# Patient Record
Sex: MALE | Race: WHITE | Employment: UNEMPLOYED | URBAN - METROPOLITAN AREA
[De-identification: names, ages, dates, MRNs, and addresses within clinical notes are randomized per-mention and may not be internally consistent; named-entity substitution may affect disease eponyms.]

---
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
## 2018-02-20 ENCOUNTER — OFFICE VISIT (OUTPATIENT)
Dept: FAMILY MEDICINE CLINIC | Age: 18
End: 2018-02-20

## 2018-02-20 VITALS
SYSTOLIC BLOOD PRESSURE: 116 MMHG | WEIGHT: 170.4 LBS | BODY MASS INDEX: 26.74 KG/M2 | DIASTOLIC BLOOD PRESSURE: 75 MMHG | HEIGHT: 67 IN | OXYGEN SATURATION: 96 % | RESPIRATION RATE: 16 BRPM | TEMPERATURE: 97.5 F | HEART RATE: 59 BPM

## 2018-02-20 DIAGNOSIS — R52 BODY ACHES: Primary | ICD-10-CM

## 2018-02-20 DIAGNOSIS — J45.41 MODERATE PERSISTENT ASTHMA WITH ACUTE EXACERBATION: ICD-10-CM

## 2018-02-20 DIAGNOSIS — R51.9 ACUTE NONINTRACTABLE HEADACHE, UNSPECIFIED HEADACHE TYPE: ICD-10-CM

## 2018-02-20 DIAGNOSIS — Z20.828 EXPOSURE TO INFLUENZA: ICD-10-CM

## 2018-02-20 LAB
FLUAV+FLUBV AG NOSE QL IA.RAPID: NEGATIVE POS/NEG
FLUAV+FLUBV AG NOSE QL IA.RAPID: NEGATIVE POS/NEG
VALID INTERNAL CONTROL?: YES

## 2018-02-20 RX ORDER — OSELTAMIVIR PHOSPHATE 75 MG/1
75 CAPSULE ORAL DAILY
Qty: 10 CAP | Refills: 0 | Status: SHIPPED | OUTPATIENT
Start: 2018-02-20 | End: 2018-02-20 | Stop reason: SDUPTHER

## 2018-02-20 RX ORDER — OSELTAMIVIR PHOSPHATE 75 MG/1
75 CAPSULE ORAL DAILY
Qty: 5 CAP | Refills: 0 | Status: SHIPPED | OUTPATIENT
Start: 2018-02-20 | End: 2018-02-21 | Stop reason: SDUPTHER

## 2018-02-20 RX ORDER — OSELTAMIVIR PHOSPHATE 75 MG/1
75 CAPSULE ORAL DAILY
Qty: 5 CAP | Refills: 0 | Status: SHIPPED | OUTPATIENT
Start: 2018-02-20 | End: 2018-02-20 | Stop reason: SDUPTHER

## 2018-02-20 NOTE — PATIENT INSTRUCTIONS
-I recommend supportive care including rest, adequate hydration, warm salt water gargle, cough drops, warm tea with honey, ibuprofen/tylenol as needed. -Return to clinic if symptoms worsen or fail to improve and/or if you develop fever non-responsive to anti-pyretic (tylenol/ibuprofen), persistent nausea/vomiting, unable to eat/drink, dehydration, confusion/altered mental status. Oseltamivir (By mouth)   Oseltamivir (er-irh-KMO-i-vir)  Treats and helps prevent influenza. Brand Name(s): Tamiflu   There may be other brand names for this medicine. When This Medicine Should Not Be Used: This medicine is not right for everyone. Do not use it if you had an allergic reaction to oseltamivir. How to Use This Medicine:   Capsule, Liquid  · Your doctor will tell you how much medicine to use. Do not use more than directed. Do not take this medicine for any illness other than the flu. · Start taking this medicine as soon as possible after flu symptoms begin or after you are exposed to the flu (within the first 2 days). · Shake the oral liquid before each use. Measure the liquid medicine with the oral dispenser that came with the medicine. Ask your pharmacist for an oral measuring spoon or syringe if you do not have one. · You may open the capsule and mix the contents with a sweet liquid (such as chocolate syrup, corn syrup, or sugar dissolved in water). Ask your doctor or pharmacist if you have any questions. · Read and follow the patient instructions that come with this medicine. Talk to your doctor or pharmacist if you have any questions. · Missed dose: If you miss a dose and your next dose is due within 2 hours, skip the missed dose and take your medicine at the normal time. Do not use extra medicine to make up for a missed dose. If you miss a dose and your next dose is due in more than 2 hours, take the missed dose as soon as you can.  Then take your next dose at the normal time, and go back to your regular schedule. · Capsules: Store at room temperature, away from heat, moisture, and direct light. · Oral liquid: Store in the refrigerator and use within 17 days. Do not freeze. You may also store the medicine at room temperature, but use it within 10 days. Throw away any medicine that has not been used within this time. Drugs and Foods to Avoid:   Ask your doctor or pharmacist before using any other medicine, including over-the-counter medicines, vitamins, and herbal products. · Do not use this medicine if you have received the live influenza vaccine (nasal mist) in the past 2 weeks or if you will receive the vaccine within 48 hours, unless your doctor says it is okay. Warnings While Using This Medicine:   · Tell your doctor if you are pregnant or breastfeeding, or if you have kidney disease, liver disease, heart disease, lung disease, or a weakened immune system. · This medicine may cause the following problems:   ¨ Serious skin reactions  ¨ Unusual thoughts or behaviors  · Call your doctor if your symptoms do not improve or if they get worse. · The liquid form of this medicine contains sorbitol. Tell your doctor if you have hereditary fructose intolerance. · This medicine is not a substitute for a yearly flu shot. It also will not prevent a bacterial infection. · Keep all medicine out of the reach of children. Never share your medicine with anyone.   Possible Side Effects While Using This Medicine:   Call your doctor right away if you notice any of these side effects:  · Allergic reaction: Itching or hives, swelling in your face or hands, swelling or tingling in your mouth or throat, chest tightness, trouble breathing  · Blistering, peeling, red skin rash  · Confusion, agitation, seeing or hearing things that are not there, change in mood or behavior, seizures  · Fever, chills, cough, sore throat, body aches  If you notice these less serious side effects, talk with your doctor:   · Nausea, vomiting, diarrhea, stomach pain, or upset stomach  If you notice other side effects that you think are caused by this medicine, tell your doctor. Call your doctor for medical advice about side effects. You may report side effects to FDA at 7-728-FDA-3830  © 2017 [address] Information is for End User's use only and may not be sold, redistributed or otherwise used for commercial purposes. The above information is an  only. It is not intended as medical advice for individual conditions or treatments. Talk to your doctor, nurse or pharmacist before following any medical regimen to see if it is safe and effective for you.

## 2018-02-20 NOTE — PROGRESS NOTES
Chief Complaint:  Chief Complaint   Patient presents with    Generalized Body Aches     Per mothet symptoms begin this morning. HX of Asthma.  Headache    Abdominal Pain     HPI  Jae Stein is a 21 y.o. male who presents for generalized body aches since this morning. No fever yet. Does have a mild headache. Mild abdominal discomfort but no vomiting, diarrhea. Multiple people in his school choir have the flu. Hasn't taken any medications for sxs today. Drinking well. Mom brought son in because years ago he had hospitalizations for his asthma, and she doesn't want him to get worse. Patient endorses on a typical week may use his albuterol inhaler several times after activity or choir. 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 5 Cap 0    albuterol (PROVENTIL HFA, VENTOLIN HFA, PROAIR HFA) 90 mcg/actuation inhaler Take 2 Puffs by inhalation every four (4) hours as needed for Wheezing.  One for home and one for school  Indications: Acute Asthma Attack 2 Inhaler 4    albuterol (PROVENTIL VENTOLIN) 2.5 mg /3 mL (0.083 %) nebulizer solution Take 2.5 mg via neb every 4 hours as needed for cough and wheeze 100 Each 4    fluticasone-salmeterol (ADVAIR DISKUS) 500-50 mcg/dose diskus inhaler INHALE ONE DOSE BY MOUTH EVERY 12 HOURS 1 Inhaler 6    EPINEPHrine (EPIPEN 2-RASHEED) 0.3 mg/0.3 mL injection 0.3 mL by IntraMUSCular route once as needed for up to 1 dose. 0.6 mL 2    loratadine (CLARITIN) 10 mg tablet Take 1 Tab by mouth daily. 30 Tab 5    inhalational spacing device 1 Each by Does Not Apply route as needed. Any brand ok 1 Device 1    methylphenidate ER 36 mg 24 hr tab Take 1 Tab (36 mg total) by mouth dailyEarliest Fill Date: 10/23/17. Max Daily Amount: 36 mg 30 Tab 0    fluticasone (FLONASE) 50 mcg/actuation nasal spray Take 1 spray each nostril twice a day 1 Bottle 5     PMH:  Past Medical History:   Diagnosis Date    ADHD (attention deficit hyperactivity disorder)     Asthma     Community acquired pneumonia     H1N1 influenza     Learning disability      SH:  Smoker:  History   Smoking Status    Never Smoker   Smokeless Tobacco    Never Used     Physical Exam:  Visit Vitals    /75 (BP 1 Location: Right arm, BP Patient Position: Sitting)    Pulse (!) 59    Temp 97.5 °F (36.4 °C) (Oral)    Resp 16    Ht 5' 7\" (1.702 m)    Wt 170 lb 6.4 oz (77.3 kg)    SpO2 96%    BMI 26.69 kg/m2     Gen: No apparent distress but does appear a little tired. Alert and oriented and responds to all questions appropriately. Eye: Conjunctiva normal, PERRL, EOMI  Ear: Hearing grossly normal bilaterally, no apparent lesion or abnormality of external ear, bilateral TM's and external ear canals normal  Nose: Nares patent without inflammation   Throat: MMM, pharynx normal without exudate or significant swelling  Neck: Supple, no lymph nodes, masses, or thyromegaly appreciated  Lungs: Respirations unlabored, clear to auscultation bilaterally  Cardio: Regular rate and rhythm without murmurs, rubs, or gallops   Abdomen: Normoactive bowel sounds, soft, nontender, nondistended    Rapid flu test negative. Assessment and Plan:       ICD-10-CM ICD-9-CM    1. Body aches R52 780.96 AMB POC CAYDEN INFLUENZA A/B TEST   2.  Acute nonintractable headache, unspecified headache type R51 784.0    3. Exposure to influenza Z20.828 V01.79    4. Moderate persistent asthma with acute exacerbation J45.41 493.92      -Will treat for influenza with tamiflu given sxs, +flu contacts, h/o asthma. Alternatively, mom may give prophylaxis dosing if he continues to do well symptomatically throughout today (dosing discussed). -Encouraged supportive care including rest, adequate hydration, warm salt water gargle, cough drops, warm tea with honey, ibuprofen/tylenol prn.   -Rtc if sxs worsen or fail to improve and/or if develops fever non-responsive to anti-pyretic, persistent N/V, unable to take po, altered mental status.  -Did not have time to fully address asthma mgt today. He is not in acute exacerbation. Encouraged f/u with Pulm asap as it sounds his asthma is not under optimal control at baseline. We will be happy to see him in clinic for further evaluation of this if he cannot see Pulm soon. Mom states she will make appt with [facility] Garcia. Patient discussed with Dr. Aisha Manzano, Attending Physician.     Jorden Branch MD  Family Medicine Resident, PGY-3

## 2018-02-20 NOTE — MR AVS SNAPSHOT
[address] 
[phone] Patient: Danilo Tse MRN: [id number] IBJ:3/30/5004 Visit Information Date & Time Provider Department Dept. Phone Encounter #  
 2/20/2018  9:10 AM Bill Drummond MD [facility] [phone] [id number] Follow-up Instructions Return if symptoms worsen or fail to improve. Upcoming Health Maintenance Date Due  
 HPV AGE 9Y-34Y (1 of 3 - Male 3 Dose Series) 6/13/2009 Influenza Age 5 to Adult 8/1/2017 DTaP/Tdap/Td series (6 - Td) 5/18/2020 Allergies as of 2/20/2018  Review Complete On: 2/20/2018 By: Bill Drummond MD  
  
 Severity Noted Reaction Type Reactions Measles, Mumps, And Rubella Vaccine Live High 11/18/2013    Anaphylaxis Pcn [Penicillins]  11/12/2013    Rash Peanut Low 11/12/2013   Not Verified Swelling Mom states not allergic Current Immunizations  Reviewed on 2/9/2015 Name Date DTaP 1/12/2000, 4/8/1999, 2/4/1999, 1998 Hep A Vaccine 5/6/2009 Hep A Vaccine 2 Dose Schedule (Ped/Adol) 10/21/2015  6:16 PM  
 Hep B Vaccine 2/4/1999, 1998, 1998 Hib 1/12/2000, 4/8/1999, 2/4/1999, 1998 Influenza Vaccine 9/20/2013, 12/10/2012, 9/30/2009, 10/23/2008, 11/29/2006, 10/10/2005 Influenza Vaccine (Quad) PF 11/3/2014 MMR 9/7/1999 Meningococcal (MPSV4) Vaccine 10/21/2015  6:14 PM  
 Meningococcal ACWY Vaccine 5/18/2010 Pneumococcal Vaccine (Unspecified Type) 8/31/2000 Poliovirus vaccine 7/28/2009, 9/7/1999, 2/4/1999, 1998 Tdap 5/18/2010, 5/6/2009 Varicella Virus Vaccine 1/12/2000 Not reviewed this visit You Were Diagnosed With   
  
 Codes Comments Body aches    -  Primary ICD-10-CM: I53 ICD-9-CM: 780.96 Acute nonintractable headache, unspecified headache type     ICD-10-CM: R51 ICD-9-CM: 784.0 Exposure to influenza     ICD-10-CM: Z20.828 ICD-9-CM: V01.79 Vitals BP Pulse Temp Resp Height(growth percentile) 116/75 (37 %/ 45 %)* (BP 1 Location: Right arm, BP Patient Position: Sitting) (!) 59 97.5 °F (36.4 °C) (Oral) 16 5' 7\" (1.702 m) (18 %, Z= -0.92) Weight(growth percentile) SpO2 BMI Smoking Status 170 lb 6.4 oz (77.3 kg) (72 %, Z= 0.57) 96% 26.69 kg/m2 (84 %, Z= 1.01) Never Smoker *BP percentiles are based on NHBPEP's 4th Report Growth percentiles are based on CDC 2-20 Years data. Vitals History BMI and BSA Data Body Mass Index Body Surface Area  
 26.69 kg/m 2 1.91 m 2 Preferred Pharmacy Pharmacy Name Phone Angel M[facility]quez [address] [place] [address], [facility] [facility] [address] [phone] Your Updated Medication List  
  
   
This list is accurate as of: 2/20/18  9:55 AM.  Always use your most recent med list.  
  
  
  
  
 * albuterol 90 mcg/actuation inhaler Commonly known as:  PROVENTIL HFA, VENTOLIN HFA, PROAIR HFA Take 2 Puffs by inhalation every four (4) hours as needed for Wheezing. One for home and one for school  Indications: Acute Asthma Attack * albuterol 2.5 mg /3 mL (0.083 %) nebulizer solution Commonly known as:  PROVENTIL VENTOLIN Take 2.5 mg via neb every 4 hours as needed for cough and wheeze EPINEPHrine 0.3 mg/0.3 mL injection Commonly known as:  EPIPEN 2-RASHEED  
0.3 mL by IntraMUSCular route once as needed for up to 1 dose. fluticasone 50 mcg/actuation nasal spray Commonly known as:  Delmon Pickup Take 1 spray each nostril twice a day  
  
 fluticasone-salmeterol 500-50 mcg/dose diskus inhaler Commonly known as:  ADVAIR DISKUS INHALE ONE DOSE BY MOUTH EVERY 12 HOURS  
  
 inhalational spacing device 1 Each by Does Not Apply route as needed. Any brand ok  
  
 loratadine 10 mg tablet Commonly known as:  Alejandra Rosenbaum Take 1 Tab by mouth daily. methylphenidate HCl 36 mg CR tablet Commonly known as:  CONCERTA Take 1 Tab (36 mg total) by mouth dailyEarliest Fill Date: 10/23/17. Max Daily Amount: 36 mg  
  
 oseltamivir 75 mg capsule Commonly known as:  TAMIFLU Take 1 Cap by mouth daily for 10 days. Indications: INFLUENZA PREVENTION, Exposure to influenza in adult with asthma * Notice: This list has 2 medication(s) that are the same as other medications prescribed for you. Read the directions carefully, and ask your doctor or other care provider to review them with you. Prescriptions Sent to Pharmacy Refills  
 oseltamivir (TAMIFLU) 75 mg capsule 0 Sig: Take 1 Cap by mouth daily for 10 days. Indications: INFLUENZA PREVENTION, Exposure to influenza in adult with asthma Class: Normal  
 Pharmacy: [address] [facility] [address], [address] [facility] [address] Ph #: [phone] Route: Oral  
  
We Performed the Following AMB POC [place] INFLUENZA A/B TEST [71404 CPT(R)] Follow-up Instructions Return if symptoms worsen or fail to improve. Patient Instructions   
-I recommend supportive care including rest, adequate hydration, warm salt water gargle, cough drops, warm tea with honey, ibuprofen/tylenol as needed. -Return to clinic if symptoms worsen or fail to improve and/or if you develop fever non-responsive to anti-pyretic (tylenol/ibuprofen), persistent nausea/vomiting, unable to eat/drink, dehydration, confusion/altered mental status. Oseltamivir (By mouth) Oseltamivir (eq-omr-WEP-i-vir) Treats and helps prevent influenza. Brand Name(s): Tamiflu There may be other brand names for this medicine. When This Medicine Should Not Be Used: This medicine is not right for everyone. Do not use it if you had an allergic reaction to oseltamivir. How to Use This Medicine:  
Capsule, Liquid · Your doctor will tell you how much medicine to use. Do not use more than directed. Do not take this medicine for any illness other than the flu. · Start taking this medicine as soon as possible after flu symptoms begin or after you are exposed to the flu (within the first 2 days). · Shake the oral liquid before each use. Measure the liquid medicine with the oral dispenser that came with the medicine. Ask your pharmacist for an oral measuring spoon or syringe if you do not have one. · You may open the capsule and mix the contents with a sweet liquid (such as chocolate syrup, corn syrup, or sugar dissolved in water). Ask your doctor or pharmacist if you have any questions. · Read and follow the patient instructions that come with this medicine. Talk to your doctor or pharmacist if you have any questions. · Missed dose: If you miss a dose and your next dose is due within 2 hours, skip the missed dose and take your medicine at the normal time. Do not use extra medicine to make up for a missed dose. If you miss a dose and your next dose is due in more than 2 hours, take the missed dose as soon as you can. Then take your next dose at the normal time, and go back to your regular schedule. · Capsules: Store at room temperature, away from heat, moisture, and direct light. · Oral liquid: Store in the refrigerator and use within 17 days. Do not freeze. You may also store the medicine at room temperature, but use it within 10 days. Throw away any medicine that has not been used within this time. Drugs and Foods to Avoid: Ask your doctor or pharmacist before using any other medicine, including over-the-counter medicines, vitamins, and herbal products. · Do not use this medicine if you have received the live influenza vaccine (nasal mist) in the past 2 weeks or if you will receive the vaccine within 48 hours, unless your doctor says it is okay. Warnings While Using This Medicine: · Tell your doctor if you are pregnant or breastfeeding, or if you have kidney disease, liver disease, heart disease, lung disease, or a weakened immune system. · This medicine may cause the following problems:  
¨ Serious skin reactions ¨ Unusual thoughts or behaviors · Call your doctor if your symptoms do not improve or if they get worse. · The liquid form of this medicine contains sorbitol. Tell your doctor if you have hereditary fructose intolerance. · This medicine is not a substitute for a yearly flu shot. It also will not prevent a bacterial infection. · Keep all medicine out of the reach of children. Never share your medicine with anyone. Possible Side Effects While Using This Medicine:  
Call your doctor right away if you notice any of these side effects: · Allergic reaction: Itching or hives, swelling in your face or hands, swelling or tingling in your mouth or throat, chest tightness, trouble breathing · Blistering, peeling, red skin rash · Confusion, agitation, seeing or hearing things that are not there, change in mood or behavior, seizures · Fever, chills, cough, sore throat, body aches If you notice these less serious side effects, talk with your doctor: · Nausea, vomiting, diarrhea, stomach pain, or upset stomach If you notice other side effects that you think are caused by this medicine, tell your doctor. Call your doctor for medical advice about side effects. You may report side effects to FDA at 3-457-FDA-8951 © 2017 [address] Information is for End User's use only and may not be sold, redistributed or otherwise used for commercial purposes. The above information is an  only. It is not intended as medical advice for individual conditions or treatments. Talk to your doctor, nurse or pharmacist before following any medical regimen to see if it is safe and effective for you. Introducing [facility]! [facility] introduces [employer] patient portal. Now you can access parts of your medical record, email your doctor's office, and request medication refills online.    
 
1. In your internet browser, go to [email or web address] 2. Click on the First Time User? Click Here link in the Sign In box. You will see the New Member Sign Up page. 3. Enter your [facility] Access Code exactly as it appears below. You will not need to use this code after youve completed the sign-up process. If you do not sign up before the expiration date, you must request a new code. · [facility] Access Code: [email or web address] Expires: 5/21/2018  9:55 AM 
 
4. Enter the last four digits of your Social Security Number (xxxx) and Date of Birth (mm/dd/yyyy) as indicated and click Submit. You will be taken to the next sign-up page. 5. Create a [employer] ID. This will be your [facility] login ID and cannot be changed, so think of one that is secure and easy to remember. 6. Create a [facility] password. You can change your password at any time. 7. Enter your Password Reset Question and Answer. This can be used at a later time if you forget your password. 8. Enter your e-mail address. You will receive e-mail notification when new information is available in [address] 9. Click Sign Up. You can now view and download portions of your medical record. 10. Click the Download Summary menu link to download a portable copy of your medical information. If you have questions, please visit the Frequently Asked Questions section of the [facility] website. Remember, [facility] is NOT to be used for urgent needs. For medical emergencies, dial 911. Now available from your iPhone and Android! Please provide this summary of care documentation to your next provider. Your primary care clinician is listed as Nancy Carmona. If you have any questions after today's visit, please call [phone].

## 2018-02-20 NOTE — PROGRESS NOTES
Chief Complaint   Patient presents with    Generalized Body Aches     Per mothet symptoms begin this morning. HX of Asthma.  Headache    Abdominal Pain     Visit Vitals    /75 (BP 1 Location: Right arm, BP Patient Position: Sitting)    Pulse (!) 59    Temp 97.5 °F (36.4 °C) (Oral)    Resp 16    Ht 5' 7\" (1.702 m)    Wt 170 lb 6.4 oz (77.3 kg)    SpO2 96%    BMI 26.69 kg/m2     1. Have you been to the ER, urgent care clinic since your last visit? Hospitalized since your last visit? No    2. Have you seen or consulted any other health care providers outside of the [address] [place] [address] [facility]? Include any pap smears or colon screening.  No.

## 2018-02-21 ENCOUNTER — TELEPHONE (OUTPATIENT)
Dept: FAMILY MEDICINE CLINIC | Age: 18
End: 2018-02-21

## 2018-02-21 RX ORDER — OSELTAMIVIR PHOSPHATE 75 MG/1
75 CAPSULE ORAL 2 TIMES DAILY
Qty: 10 CAP | Refills: 0 | Status: SHIPPED | OUTPATIENT
Start: 2018-02-21 | End: 2018-02-26

## 2018-02-21 NOTE — TELEPHONE ENCOUNTER
I tried to call this patient/his mom but received VM. Will you let her know the first prescription I gave her was inaccurate. The treatment dose of tamiflu for the flu is tamiflu 75 mg twice daily x 5 days (I accidentally originally provided a script for once daily x 5 days). Please let her know I have sent the updated script to his pharmacy.  Thanks, AH

## 2018-02-22 NOTE — PROGRESS NOTES
I reviewed with the resident the medical history and the resident's findings on the physical examination. I discussed with the resident the patient's diagnosis and concur with the plan.     Rapid flu negative but sx concerning for flu  Treat empirically

## 2018-02-26 ENCOUNTER — TELEPHONE (OUTPATIENT)
Dept: FAMILY MEDICINE CLINIC | Age: 18
End: 2018-02-26

## 2018-02-26 NOTE — TELEPHONE ENCOUNTER
Ezekiel Leung, I tried to call this patient's mom but received VM. Will you let her know the first prescription I gave her was inaccurate. The treatment dose of tamiflu for the flu is tamiflu 75 mg twice daily x 5 days (I accidentally originally provided a script for once daily x 5 days). Please let her know I have sent the updated script to his pharmacy.  Thanks, AH

## 2018-02-26 NOTE — TELEPHONE ENCOUNTER
It has been 5 days since you sent this message (I was out sick last week). What do you want me to tell this pt now?

## 2019-02-14 ENCOUNTER — OFFICE VISIT (OUTPATIENT)
Dept: FAMILY MEDICINE CLINIC | Age: 19
End: 2019-02-14

## 2019-02-14 VITALS
OXYGEN SATURATION: 94 % | SYSTOLIC BLOOD PRESSURE: 118 MMHG | HEIGHT: 67 IN | HEART RATE: 84 BPM | TEMPERATURE: 98 F | WEIGHT: 179 LBS | DIASTOLIC BLOOD PRESSURE: 72 MMHG | BODY MASS INDEX: 28.09 KG/M2 | RESPIRATION RATE: 16 BRPM

## 2019-02-14 DIAGNOSIS — J45.51 SEVERE PERSISTENT ASTHMA WITH ACUTE EXACERBATION: Primary | ICD-10-CM

## 2019-02-14 DIAGNOSIS — Z91.018 FOOD ALLERGY: ICD-10-CM

## 2019-02-14 DIAGNOSIS — K20.0 ALLERGIC EOSINOPHILIC ESOPHAGITIS: ICD-10-CM

## 2019-02-14 RX ORDER — FLUTICASONE PROPIONATE AND SALMETEROL 500; 50 UG/1; UG/1
1 POWDER RESPIRATORY (INHALATION) EVERY 12 HOURS
Qty: 1 INHALER | Refills: 6 | Status: SHIPPED | OUTPATIENT
Start: 2019-02-14 | End: 2019-06-17 | Stop reason: SDUPTHER

## 2019-02-14 RX ORDER — ALBUTEROL SULFATE 90 UG/1
2 AEROSOL, METERED RESPIRATORY (INHALATION)
Qty: 2 INHALER | Refills: 4 | Status: SHIPPED | OUTPATIENT
Start: 2019-02-14 | End: 2019-11-18 | Stop reason: SDUPTHER

## 2019-02-14 RX ORDER — EPINEPHRINE 0.3 MG/.3ML
0.3 INJECTION SUBCUTANEOUS
Qty: 0.6 ML | Refills: 2 | Status: SHIPPED | OUTPATIENT
Start: 2019-02-14 | End: 2019-02-14

## 2019-02-14 RX ORDER — IPRATROPIUM BROMIDE AND ALBUTEROL SULFATE 2.5; .5 MG/3ML; MG/3ML
3 SOLUTION RESPIRATORY (INHALATION)
Qty: 3 ML | Refills: 0
Start: 2019-02-14 | End: 2019-02-14

## 2019-02-14 RX ORDER — ALBUTEROL SULFATE 0.83 MG/ML
SOLUTION RESPIRATORY (INHALATION)
Qty: 100 EACH | Refills: 4 | Status: SHIPPED | OUTPATIENT
Start: 2019-02-14 | End: 2020-03-05 | Stop reason: SDUPTHER

## 2019-02-14 RX ORDER — PREDNISONE 20 MG/1
60 TABLET ORAL
Qty: 15 TAB | Refills: 0 | Status: SHIPPED | OUTPATIENT
Start: 2019-02-14 | End: 2019-02-19

## 2019-02-14 RX ORDER — METHYLPREDNISOLONE ACETATE 80 MG/ML
80 INJECTION, SUSPENSION INTRA-ARTICULAR; INTRALESIONAL; INTRAMUSCULAR; SOFT TISSUE ONCE
Qty: 1 VIAL | Refills: 0
Start: 2019-02-14 | End: 2019-02-14

## 2019-02-14 NOTE — PATIENT INSTRUCTIONS

## 2019-02-14 NOTE — PROGRESS NOTES
[address] [place] [address] with [address] [place] [address]     Chief Complaint: Selena Fabian is worsening\"    Subjective  Garza Ogren is an 19 y.o. male with a history of severe persistant asthma who presents for worsening shortness of breath and wheezing. States that he has been using his advair over the past few months due to insurance difficulties. His asthma usually flares this time of year and he requires steroids during these. There is increased cough and wheezing. Has been using home nebulizer every 2 hours for the past day. Was admitted to [facility] ER a few weeks ago for allergic esophagitis reactive to food bolus. Had to undergo endoscopy with general anesthesia for removal.  He has never been intubated on other occasions. Allergies - reviewed: Allergies   Allergen Reactions    Measles, Mumps, And Rubella Vaccine Live Anaphylaxis    Pcn [Penicillins] Rash    Peanut Swelling     Mom states not allergic        Medications - reviewed:   Current Outpatient Medications   Medication Sig    fluticasone-salmeterol (ADVAIR) 500-50 mcg/dose diskus inhaler Take 1 Puff by inhalation every twelve (12) hours.  albuterol (PROVENTIL HFA, VENTOLIN HFA, PROAIR HFA) 90 mcg/actuation inhaler Take 2 Puffs by inhalation every four (4) hours as needed for Wheezing.  EPINEPHrine (EPIPEN 2-RASHEED) 0.3 mg/0.3 mL injection 0.3 mL by IntraMUSCular route once as needed for up to 1 dose.  albuterol (PROVENTIL VENTOLIN) 2.5 mg /3 mL (0.083 %) nebulizer solution Take 2.5 mg via neb every 4 hours as needed for cough and wheeze    albuterol-ipratropium (DUO-NEB) 2.5 mg-0.5 mg/3 ml nebu 3 mL by Nebulization route now for 1 dose.  methylPREDNISolone acetate (DEPO-MEDROL) 80 mg/mL injection 1 mL by IntraMUSCular route once for 1 dose.  predniSONE (DELTASONE) 20 mg tablet Take 60 mg by mouth daily (with breakfast) for 5 days.     fluticasone (FLONASE) 50 mcg/actuation nasal spray Take 1 spray each nostril twice a day    inhalational spacing device 1 Each by Does Not Apply route as needed. Any brand ok     No current facility-administered medications for this visit. I have reviewed and updated the histories as listed below:    Past Medical History - reviewed:  Past Medical History:   Diagnosis Date    ADHD (attention deficit hyperactivity disorder)     Asthma     Community acquired pneumonia     H1N1 influenza     Learning disability          Past Surgical History - reviewed:   History reviewed. No pertinent surgical history.       Social History - reviewed:  Social History     Socioeconomic History    Marital status: SINGLE     Spouse name: Not on file    Number of children: Not on file    Years of education: Not on file    Highest education level: Not on file   Social Needs    Financial resource strain: Not on file    Food insecurity - worry: Not on file    Food insecurity - inability: Not on file    Transportation needs - medical: Not on file   [employer] needs - non-medical: Not on file   Occupational History    Not on file   Tobacco Use    Smoking status: Never Smoker    Smokeless tobacco: Never Used   Substance and Sexual Activity    Alcohol use: No    Drug use: No    Sexual activity: No   Other Topics Concern    Not on file   Social History Narrative    Not on file         Family History - reviewed:  Family History   Problem Relation Age of Onset    Cancer Maternal Grandmother     Diabetes Maternal Grandfather     Heart Disease Maternal Grandfather 39        premature CAD age 39, CABG    Heart Disease Paternal Grandfather     Diabetes Paternal Grandfather     Cancer Paternal Grandfather     Asthma Brother     Allergy-severe Mother     Asthma Mother         grew out of     Eczema Mother          Immunizations - reviewed:   Immunization History   Administered Date(s) Administered    DTaP 1998, 02/04/1999, 04/08/1999, 01/12/2000    Hep A Vaccine 05/06/2009    Hep A Vaccine 2 Dose Schedule (Ped/Adol) 10/21/2015    Hep B Vaccine 1998, 1998, 02/04/1999    Hib 1998, 02/04/1999, 04/08/1999, 01/12/2000    Influenza Vaccine 10/10/2005, 11/29/2006, 10/23/2008, 09/30/2009, 12/10/2012, 09/20/2013    Influenza Vaccine (Quad) PF 11/03/2014    MMR 09/07/1999    Meningococcal (MPSV4) Vaccine 10/21/2015    Meningococcal ACWY Vaccine 05/18/2010    Pneumococcal Vaccine (Unspecified Type) 08/31/2000    Poliovirus vaccine 1998, 02/04/1999, 09/07/1999, 07/28/2009    Tdap 05/06/2009, 05/18/2010    Varicella Virus Vaccine 01/12/2000     Review of Systems  Review of Systems   Constitutional: Negative for diaphoresis and fever. HENT: Negative for congestion. Eyes: Negative for visual disturbance. Respiratory: Positive for cough, shortness of breath and wheezing. Cardiovascular: Negative for chest pain. Gastrointestinal: Negative for constipation, diarrhea, nausea and vomiting. Neurological: Negative for light-headedness and headaches. All other systems reviewed and are negative. Physical Exam    Visit Vitals  /72   Pulse 84   Temp 98 °F (36.7 °C) (Oral)   Resp 16   Ht 5' 7\" (1.702 m)   Wt 179 lb (81.2 kg)   SpO2 94%    L/min   BMI 28.04 kg/m²     Peak flow 365, which improved to 410 following duoneb treatment. Physical Exam   Constitutional: He is oriented to person, place, and time. He appears well-nourished. No distress. HENT:   Head: Normocephalic. Right Ear: Hearing, tympanic membrane, external ear and ear canal normal.   Left Ear: Hearing, tympanic membrane, external ear and ear canal normal.   Mouth/Throat: Oropharynx is clear and moist. No oropharyngeal exudate. Eyes: Conjunctivae and EOM are normal. Pupils are equal, round, and reactive to light. Right eye exhibits no discharge. Left eye exhibits no discharge. Neck: Normal range of motion. Neck supple. No thyromegaly present. Cardiovascular: Normal rate, regular rhythm, normal heart sounds and intact distal pulses. Exam reveals no gallop and no friction rub. No murmur heard. Pulmonary/Chest: Effort normal. No respiratory distress. He has wheezes (slight). He has no rales. He exhibits no tenderness. Decereased air movement in all fields. Improved following nebulizer treatment. Musculoskeletal: Normal range of motion. He exhibits no edema. Lymphadenopathy:     He has no cervical adenopathy. Neurological: He is alert and oriented to person, place, and time. He has normal strength. No cranial nerve deficit. Skin: Skin is warm and dry. He is not diaphoretic. No erythema. Psychiatric: He has a normal mood and affect. Nursing note and vitals reviewed. Assessment    ICD-10-CM ICD-9-CM    1. Severe persistent asthma with acute exacerbation J45.51 493.92 fluticasone-salmeterol (ADVAIR) 500-50 mcg/dose diskus inhaler      albuterol (PROVENTIL HFA, VENTOLIN HFA, PROAIR HFA) 90 mcg/actuation inhaler      albuterol (PROVENTIL VENTOLIN) 2.5 mg /3 mL (0.083 %) nebulizer solution      INHAL RX, AIRWAY OBST/DX SPUTUM INDUCT      ALBUTEROL IPRATROP NON-COMP      AL PRESSURIZED/NONPRESSURIZED INHALATION TREATMENT      albuterol-ipratropium (DUO-NEB) 2.5 mg-0.5 mg/3 ml nebu      METHYLPREDNISOLONE ACETATE INJECTION 80 MG      AL THER/PROPH/DIAG INJECTION, SUBCUT/IM      methylPREDNISolone acetate (DEPO-MEDROL) 80 mg/mL injection      predniSONE (DELTASONE) 20 mg tablet   2. Allergic eosinophilic esophagitis T26.9 530.13 EPINEPHrine (EPIPEN 2-RASHEED) 0.3 mg/0.3 mL injection   3. Food allergy Z91.018 V15.05 EPINEPHrine (EPIPEN 2-RASHEED) 0.3 mg/0.3 mL injection     Plan  1. Allergic eosinophilic esophagitis - admitted to [facility] for this issue. S/p endoscopy. Will refill epipen today. - EPINEPHrine (EPIPEN 2-RASHEED) 0.3 mg/0.3 mL injection; 0.3 mL by IntraMUSCular route once as needed for up to 1 dose. Dispense: 0.6 mL; Refill: 2    2.  Severe persistent asthma with acute exacerbation - in acute exacerbation. Breathing improved s/p duoneb in office today. Given IM solumedrol. Will write for steroid burst.  Needs to get back on advair. Write for new nebulizer. Will give new inhaler and nebules. Needs to follow up in 1 day to monitor progression.  - fluticasone-salmeterol (ADVAIR) 500-50 mcg/dose diskus inhaler; Take 1 Puff by inhalation every twelve (12) hours. Dispense: 1 Inhaler; Refill: 6  - albuterol (PROVENTIL HFA, VENTOLIN HFA, PROAIR HFA) 90 mcg/actuation inhaler; Take 2 Puffs by inhalation every four (4) hours as needed for Wheezing. Dispense: 2 Inhaler; Refill: 4  - albuterol (PROVENTIL VENTOLIN) 2.5 mg /3 mL (0.083 %) nebulizer solution; Take 2.5 mg via neb every 4 hours as needed for cough and wheeze  Dispense: 100 Each; Refill: 4  - INHAL RX, AIRWAY OBST/DX SPUTUM INDUCT  - ALBUTEROL IPRATROP NON-COMP  - SC PRESSURIZED/NONPRESSURIZED INHALATION TREATMENT  - albuterol-ipratropium (DUO-NEB) 2.5 mg-0.5 mg/3 ml nebu; 3 mL by Nebulization route now for 1 dose. Dispense: 3 mL; Refill: 0  - METHYLPREDNISOLONE ACETATE INJECTION 80 MG  - SC THER/PROPH/DIAG INJECTION, SUBCUT/IM  - methylPREDNISolone acetate (DEPO-MEDROL) 80 mg/mL injection; 1 mL by IntraMUSCular route once for 1 dose. Dispense: 1 Vial; Refill: 0  - predniSONE (DELTASONE) 20 mg tablet; Take 60 mg by mouth daily (with breakfast) for 5 days. Dispense: 15 Tab; Refill: 0    3. Food allergy  - EPINEPHrine (EPIPEN 2-RASHEED) 0.3 mg/0.3 mL injection; 0.3 mL by IntraMUSCular route once as needed for up to 1 dose. Dispense: 0.6 mL; Refill: 2    Follow-up Disposition:  Return in about 1 day (around 2/15/2019) for asthma exacerbation follow up. I have discussed the diagnosis with the patient and the intended plan as seen in the above orders. Patient verbalized understanding of the plan and agrees with the plan.  The patient has received an after-visit summary and questions were answered concerning future plans. I have discussed medication side effects and warnings with the patient as well. Informed patient to return to the office if new symptoms arise.     Patient was discussed with Dr. Olga Reid MD  Family Medicine Resident

## 2019-02-15 ENCOUNTER — OFFICE VISIT (OUTPATIENT)
Dept: FAMILY MEDICINE CLINIC | Age: 19
End: 2019-02-15

## 2019-02-15 ENCOUNTER — TELEPHONE (OUTPATIENT)
Dept: FAMILY MEDICINE CLINIC | Age: 19
End: 2019-02-15

## 2019-02-15 VITALS
DIASTOLIC BLOOD PRESSURE: 64 MMHG | WEIGHT: 182 LBS | BODY MASS INDEX: 28.56 KG/M2 | OXYGEN SATURATION: 94 % | RESPIRATION RATE: 16 BRPM | TEMPERATURE: 98.7 F | HEIGHT: 67 IN | SYSTOLIC BLOOD PRESSURE: 129 MMHG | HEART RATE: 73 BPM

## 2019-02-15 DIAGNOSIS — J45.41 MODERATE PERSISTENT ASTHMA WITH ACUTE EXACERBATION: Primary | ICD-10-CM

## 2019-02-15 NOTE — PROGRESS NOTES
Chief Complaint   Patient presents with    Follow-up       1. Have you been to the ER, urgent care clinic since your last visit? Hospitalized since your last visit? No    2. Have you seen or consulted any other health care providers outside of the [address] [place] [address] since your last visit? Include any pap smears or colon screening.  No

## 2019-02-15 NOTE — PROGRESS NOTES
Chief Complaint   Patient presents with    Follow-up   :    HPI  Jasmina Trujillo is a 19 y.o. male who presents for a follow up on asthma exacerbation. Feels much better. No fever. Played foot ball today he is continuing his treatment. Patient denies fever, chills  CP/ SOB/ N/V/ diarrhea. Allergies - reviewed: Allergies   Allergen Reactions    Measles, Mumps, And Rubella Vaccine Live Anaphylaxis    Pcn [Penicillins] Rash    Peanut Swelling     Mom states not allergic        Meds - reviewed:   Current Outpatient Medications   Medication Sig Dispense Refill    fluticasone-salmeterol (ADVAIR) 500-50 mcg/dose diskus inhaler Take 1 Puff by inhalation every twelve (12) hours. 1 Inhaler 6    albuterol (PROVENTIL HFA, VENTOLIN HFA, PROAIR HFA) 90 mcg/actuation inhaler Take 2 Puffs by inhalation every four (4) hours as needed for Wheezing. 2 Inhaler 4    albuterol (PROVENTIL VENTOLIN) 2.5 mg /3 mL (0.083 %) nebulizer solution Take 2.5 mg via neb every 4 hours as needed for cough and wheeze 100 Each 4    predniSONE (DELTASONE) 20 mg tablet Take 60 mg by mouth daily (with breakfast) for 5 days. 15 Tab 0    fluticasone (FLONASE) 50 mcg/actuation nasal spray Take 1 spray each nostril twice a day 1 Bottle 5    inhalational spacing device 1 Each by Does Not Apply route as needed.  Any brand ok 1 Device 1       PMH- reviewed:  Past Medical History:   Diagnosis Date    ADHD (attention deficit hyperactivity disorder)     Asthma     Community acquired pneumonia     H1N1 influenza     Learning disability        SH- reviewed:  Smoker:  Social History     Tobacco Use   Smoking Status Never Smoker   Smokeless Tobacco Never Used       ETOH- reviewed:   Social History     Substance and Sexual Activity   Alcohol Use No       FH- reviewed:   Family History   Problem Relation Age of Onset    Cancer Maternal Grandmother     Diabetes Maternal Grandfather     Heart Disease Maternal Grandfather 39        premature CAD age 39, CABG    Heart Disease Paternal Grandfather     Diabetes Paternal Grandfather     Cancer Paternal Grandfather     Asthma Brother     Allergy-severe Mother     Asthma Mother         grew out of     Eczema Mother          ROS: Positive for Items in bold:   Constitutional: headache, fever, fatigue, weight loss or weight gain      Nose: congestion, rhinorrhea  Oropharynx: sore throat, lesions in throat  Cardiac: chest pain      Resp: cough or shortness of breath      GI: nausea, vomiting, constipation, diarrhea, blood in stool    Physical Exam:  Visit Vitals  /64   Pulse 73   Temp 98.7 °F (37.1 °C) (Oral)   Resp 16   Ht 5' 7\" (1.702 m)   Wt 182 lb (82.6 kg)   SpO2 94%   BMI 28.51 kg/m²       Gen: No apparent distress. Alert and oriented and responds to all questions appropriately. Eyes: Conjunctiva clear, extraocular movements are intact. Neck: Supple; no masses; no thyromegaly appreciated. No cervical LAD  Lungs: Respirations unlabored; clear to auscultation bilaterally  Cardio: Regular, rate, and rhythm without murmurs, gallops or rubs   Abdomen: Soft; nontender; nondistended; normoactive bowel sounds; no masses or organomegaly  Skin: No obvious rashes. Lab Results   Component Value Date/Time    Sodium 142 (H) 05/05/2015 03:40 PM    Potassium 3.9 05/05/2015 03:40 PM    Chloride 106 05/05/2015 03:40 PM    CO2 25 05/05/2015 03:40 PM    Anion gap 11 05/05/2015 03:40 PM    Glucose 95 05/05/2015 03:40 PM    BUN 15 05/05/2015 03:40 PM    Creatinine 0.78 05/05/2015 03:40 PM    BUN/Creatinine ratio 19 05/05/2015 03:40 PM    GFR est AA CANNOT BE CALCULATED 05/05/2015 03:40 PM    GFR est non-AA CANNOT BE CALCULATED 05/05/2015 03:40 PM    Calcium 8.8 05/05/2015 03:40 PM         I have personally reviewed the labs results        Assessment and Plan:   Yesenia Lopez is a 19 y.o. male who presents for follow up on asthma exacerbation. Doing much better. Complete current course of treatment.  ED precautions given      ICD-10-CM ICD-9-CM    1. Moderate persistent asthma with acute exacerbation J45.41 493.92          Discussed diagnoses in detail with patient. Patient expressed understanding of and agreement to above plan. All questions and concerns addressed. Medication risks/benefits/side effects discussed with patient. Patient is counseled to return to the office and/or ED if symptoms do not improve as expected. Patient  discussed with Dr. Justino Gabriel, Attending Physician. Alexi Healy MD  02/15/19    Family Medicine Resident

## 2019-06-17 ENCOUNTER — OFFICE VISIT (OUTPATIENT)
Dept: FAMILY MEDICINE CLINIC | Age: 19
End: 2019-06-17

## 2019-06-17 VITALS
SYSTOLIC BLOOD PRESSURE: 116 MMHG | RESPIRATION RATE: 18 BRPM | HEART RATE: 96 BPM | TEMPERATURE: 98.1 F | WEIGHT: 186 LBS | BODY MASS INDEX: 29.19 KG/M2 | DIASTOLIC BLOOD PRESSURE: 69 MMHG | OXYGEN SATURATION: 96 % | HEIGHT: 67 IN

## 2019-06-17 DIAGNOSIS — F90.0 ATTENTION DEFICIT HYPERACTIVITY DISORDER (ADHD), PREDOMINANTLY INATTENTIVE TYPE: Primary | ICD-10-CM

## 2019-06-17 DIAGNOSIS — F41.9 ANXIETY AND DEPRESSION: ICD-10-CM

## 2019-06-17 DIAGNOSIS — J45.40 MODERATE PERSISTENT ASTHMA WITHOUT COMPLICATION: ICD-10-CM

## 2019-06-17 DIAGNOSIS — F32.A ANXIETY AND DEPRESSION: ICD-10-CM

## 2019-06-17 RX ORDER — FLUTICASONE PROPIONATE AND SALMETEROL 500; 50 UG/1; UG/1
1 POWDER RESPIRATORY (INHALATION) EVERY 12 HOURS
Qty: 1 INHALER | Refills: 6 | Status: SHIPPED | OUTPATIENT
Start: 2019-06-17 | End: 2019-11-18 | Stop reason: SDUPTHER

## 2019-06-17 RX ORDER — MONTELUKAST SODIUM 5 MG/1
5 TABLET, CHEWABLE ORAL
Qty: 30 TAB | Refills: 3 | Status: SHIPPED | OUTPATIENT
Start: 2019-06-17

## 2019-06-17 NOTE — PROGRESS NOTES
Chief Complaint   Patient presents with    Request For New Medication     wants to restart ADHD medicine      1. Have you been to the ER, urgent care clinic since your last visit? Hospitalized since your last visit? No    2. Have you seen or consulted any other health care providers outside of the [address] [place] [address] since your last visit? Include any pap smears or colon screening.  No

## 2019-06-17 NOTE — PROGRESS NOTES
[facility] Tawnya Hayes    Office [phone], Fax [phone]    Subjective:     Chief Complaint   Patient presents with    Request For New Medication     wants to restart ADHD medicine      History provided by patient     HPI:  Remedios Santana is a 22 y.o. WHITE [place]  male presents for med recheck for ADHD and asthma. Significant history pertinent to presentation includes asthma, ADHD, depression, and anxiety. ADHD, depression, anxiety  - Last year, pt was finishing high school and had taken a \"Ritalin\" holiday for the year as it was an easy year. Now working a , he is having difficulty focusing. Agreeable to being on non-stimulant.   - Sees psychiatrist Dr Caren Mckenna [facility]) - saw her 3 month ago. Per patient, she was thinking about starting him on non-stimulant as well. Currently on Wellbutrin 150mg and hydroxyzine. Moderate persistent asthma  - Left his advair and albuterol inhaler at home. Takes Advair and albuterol twice a day. Only had advair  although he is prescribed the  and says he is much better when he is on it. He has run out and would like refill.   - In the past on singulair but does not know why he is not on it anymore. Medication reviewed. Allergy reviewed. SocHx. - Denies smoking, alcohol use, illcit drug use.      ROS (bolded are positive):   General Negative for fever, chills, changes in weight, changes in appetite   HEENT Negative for hearing loss, earache, congestion, sore throat   CV Negative for chest pain, palpitations, edema   Respiratory Negative for cough, shortness of breath, mild wheezing (due forgetting inhaler)   GI Negative for change in bowel habits, abdominal pain, black or bloody stools, nausea or vomiting    Negative for frequency, dysuria, hematuria, vaginal discharge   MSK Negative for back pain, joint pain, muscle pain   Skin Negative for itching, rash, hives   Neuro Negative for dizziness, headache, confusion, weakness   Psych Negative for anxiety, depression, change in mood     Objective:   Vitals - reviewed  Visit Vitals  /69   Pulse 96   Temp 98.1 °F (36.7 °C) (Oral)   Resp 18   Ht 5' 7\" (1.702 m)   Wt 186 lb (84.4 kg)   SpO2 96%   BMI 29.13 kg/m²        Physical exam:   GEN: NAD. Alert. Well nourished. EYES:  Conjunctiva clear; PERRLA. extraocular movements are intact. EAR: External ears are normal. Tympanic membranes are clear and without effusion. NOSE: Turbinates are within normal limits. No drainage  OROPHYARYNX: No oral lesions or exudates. NECK:  Supple; no masses; thyroid normal           LUNGS: mild exp wheeze +  CARDIOVASCULAR: Regular, rate, and rhythm without murmurs, gallops or rubs   NEUROLOGIC:  No focal neurologic deficits. Strength and sensation grossly intact. EXT: Well perfused. No edema. No erythema. PSYCH: appropriate mood and affect. Good insight and judgement. Cooperative. SKIN: No obvious rashes or lesions. Pertinent Labs/Studies:   - given duoneb - improved afterwards. Assessment and orders:       ICD-10-CM ICD-9-CM    1. Attention deficit hyperactivity disorder (ADHD), predominantly inattentive type F90.0 314.00    2. Moderate persistent asthma without complication B54.93 980.26 fluticasone propion-salmeterol (ADVAIR/WIXELA) 500-50 mcg/dose diskus inhaler      montelukast (SINGULAIR) 5 mg chewable tablet      VA PRESSURIZED/NONPRESSURIZED INHALATION TREATMENT      ALBUTEROL IPRATROP NON-COMP      INHAL RX, AIRWAY OBST/DX SPUTUM INDUCT   3. Anxiety and depression F41.9 300.00     F32.9 311      Diagnoses and all orders for this visit:    1. Attention deficit hyperactivity disorder (ADHD), predominantly inattentive type. Will discuss with his psychiatrist to see if he would benefit from straterra vs re-testing if he still has ADHD. 2. Moderate persistent asthma without complication.  Pt given the advair  dosage as he is better on that (instead of the lower dose). Given 1 dose of duo-neb in clinic as he forgot his inhalers at home and was wheezing. To start him on singulair as well as asthma is not well controlled. To f/u in 2 weeks to assess improvement. -     fluticasone propion-salmeterol (ADVAIR/WIXELA) 500-50 mcg/dose diskus inhaler; Take 1 Puff by inhalation every twelve (12) hours. -     montelukast (SINGULAIR) 5 mg chewable tablet; Take 1 Tab by mouth nightly. -     [place] PRESSURIZED/NONPRESSURIZED INHALATION TREATMENT  -     ALBUTEROL IPRATROP NON-COMP  -     INHAL RX, AIRWAY OBST/DX SPUTUM INDUCT    3. Anxiety and depression. Stable on Wellbutrin and hydroxyzine. Follow-up and Dispositions    · Return in about 2 weeks (around 7/1/2019) for ADHD and Asthma. Pt was discussed with Dr Marco A Hairston (attending physician). I have reviewed patient medical and social history and medications. I have reviewed pertinent labs results and other data. I have discussed the diagnosis with the patient and the intended plan as seen in the above orders. The patient has received an after-visit summary and questions were answered concerning future plans. I have discussed medication side effects and warnings with the patient as well.     Radha Robbins MD  Resident [address] [place] [address]  06/17/19

## 2019-06-19 ENCOUNTER — TELEPHONE (OUTPATIENT)
Dept: FAMILY MEDICINE CLINIC | Age: 19
End: 2019-06-19

## 2019-06-19 DIAGNOSIS — F90.0 ATTENTION DEFICIT HYPERACTIVITY DISORDER (ADHD), PREDOMINANTLY INATTENTIVE TYPE: Primary | ICD-10-CM

## 2019-06-19 RX ORDER — ATOMOXETINE 10 MG/1
10 CAPSULE ORAL
Qty: 30 CAP | Refills: 0 | Status: SHIPPED | OUTPATIENT
Start: 2019-06-19 | End: 2020-12-08 | Stop reason: SDUPTHER

## 2019-06-19 NOTE — TELEPHONE ENCOUNTER
Spoke with his psychiatrist Dr Bree Hirsch who agrees with starting him on Strattera and having him follow up with her for optimization. Called patient and informed him including possible side effects . Rx sent to pharmacy. To f/u in 2 weeks at clinic and with his psychiatrist as well.      Silvestre Meadows MD

## 2019-11-18 ENCOUNTER — OFFICE VISIT (OUTPATIENT)
Dept: FAMILY MEDICINE CLINIC | Age: 19
End: 2019-11-18

## 2019-11-18 VITALS
BODY MASS INDEX: 30.61 KG/M2 | OXYGEN SATURATION: 95 % | HEART RATE: 69 BPM | RESPIRATION RATE: 17 BRPM | DIASTOLIC BLOOD PRESSURE: 70 MMHG | TEMPERATURE: 98.1 F | SYSTOLIC BLOOD PRESSURE: 113 MMHG | WEIGHT: 195 LBS | HEIGHT: 67 IN

## 2019-11-18 DIAGNOSIS — J45.40 MODERATE PERSISTENT ASTHMA WITHOUT COMPLICATION: ICD-10-CM

## 2019-11-18 RX ORDER — FLUTICASONE PROPIONATE AND SALMETEROL 500; 50 UG/1; UG/1
1 POWDER RESPIRATORY (INHALATION) EVERY 12 HOURS
Qty: 1 INHALER | Refills: 6 | Status: SHIPPED | OUTPATIENT
Start: 2019-11-18 | End: 2020-12-08 | Stop reason: SDUPTHER

## 2019-11-18 RX ORDER — ALBUTEROL SULFATE 90 UG/1
2 AEROSOL, METERED RESPIRATORY (INHALATION)
Qty: 2 INHALER | Refills: 5 | Status: SHIPPED | OUTPATIENT
Start: 2019-11-18 | End: 2020-07-27 | Stop reason: SDUPTHER

## 2019-11-18 NOTE — PROGRESS NOTES
Chief Complaint   Patient presents with    Medication Evaluation     1. Have you been to the ER, urgent care clinic since your last visit? Hospitalized since your last visit? No    2. Have you seen or consulted any other health care providers outside of the [address] [place] [address] since your last visit? Include any pap smears or colon screening.  No

## 2019-11-18 NOTE — PROGRESS NOTES
HPI       Chief Complaint   Patient presents with    Medication Evaluation       Jamila Goncalves is a 22 y.o. male who presents for medicine refill for asthma  He was not using his Advair daily but got back on it daily in [place]. Before he was using it as needed   He says his symptoms last week were worst at night. He was using albuterol every night  During the day he is only using it maybe once a day. He hasn't had the need to use nebulizer at home. Today he feels ok. He used his Advair today. Denies SOB, wheezing  He needs refill on the Advair and Albuterol inhaler. PMHx-reviewed:  Past Medical History:   Diagnosis Date    ADHD (attention deficit hyperactivity disorder)     Asthma     Community acquired pneumonia     H1N1 influenza     Learning disability      Meds-reviewed:   Current Outpatient Medications   Medication Sig Dispense Refill    fluticasone propion-salmeterol (ADVAIR/WIXELA) 500-50 mcg/dose diskus inhaler Take 1 Puff by inhalation every twelve (12) hours. 1 Inhaler 6    albuterol (PROVENTIL HFA, VENTOLIN HFA, PROAIR HFA) 90 mcg/actuation inhaler Take 2 Puffs by inhalation every four (4) hours as needed for Wheezing. Indications: Asthma Attack 2 Inhaler 5    atomoxetine (STRATTERA) 10 mg capsule Take 1 Cap by mouth every morning. 30 Cap 0    montelukast (SINGULAIR) 5 mg chewable tablet Take 1 Tab by mouth nightly. 30 Tab 3    albuterol (PROVENTIL VENTOLIN) 2.5 mg /3 mL (0.083 %) nebulizer solution Take 2.5 mg via neb every 4 hours as needed for cough and wheeze 100 Each 4    fluticasone (FLONASE) 50 mcg/actuation nasal spray Take 1 spray each nostril twice a day 1 Bottle 5    inhalational spacing device 1 Each by Does Not Apply route as needed. Any brand ok 1 Device 1     Allergies-reviewed:    Allergies   Allergen Reactions    Measles, Mumps, And Rubella Vaccine Live Anaphylaxis    Pcn [Penicillins] Rash    Peanut Swelling     Mom states not allergic Smoker-reviewed:  Social History     Tobacco Use   Smoking Status Never Smoker   Smokeless Tobacco Never Used     ETOH-reviewed:   Social History     Substance and Sexual Activity   Alcohol Use No     FH-reviewed:   Family History   Problem Relation Age of Onset    Cancer Maternal Grandmother     Diabetes Maternal Grandfather     Heart Disease Maternal Grandfather 39        premature CAD age 39, CABG    Heart Disease Paternal Grandfather     Diabetes Paternal Grandfather     Cancer Paternal Grandfather     Asthma Brother     Allergy-severe Mother     Asthma Mother         grew out of     Eczema Mother      ROS:  Review of Systems   Constitutional: Negative. Respiratory: Negative. Cardiovascular: Negative. Skin: Negative. Physical Exam:  Visit Vitals  /70 (BP 1 Location: Right arm, BP Patient Position: Sitting)   Pulse 69   Temp 98.1 °F (36.7 °C) (Oral)   Resp 17   Ht 5' 7\" (1.702 m)   Wt 195 lb (88.5 kg)   SpO2 95%   BMI 30.54 kg/m²       Wt Readings from Last 3 Encounters:   11/18/19 195 lb (88.5 kg)   06/17/19 186 lb (84.4 kg)   02/15/19 182 lb (82.6 kg)     BP Readings from Last 3 Encounters:   11/18/19 113/70   06/17/19 116/69   02/15/19 129/64      Physical Exam   Constitutional: He appears well-developed and well-nourished. No distress. Cardiovascular: Normal rate and regular rhythm. No murmur heard. Pulmonary/Chest: Effort normal and breath sounds normal. No stridor. He has no wheezes. He has no rales. Abdominal: Soft. Bowel sounds are normal. There is no tenderness. Skin: Skin is warm and dry. No rash noted. Assessment     22 y.o. male with:    ICD-10-CM ICD-9-CM    1.  Moderate persistent asthma without complication C61.20 179.15 fluticasone propion-salmeterol (ADVAIR/WIXELA) 500-50 mcg/dose diskus inhaler      albuterol (PROVENTIL HFA, VENTOLIN HFA, PROAIR HFA) 90 mcg/actuation inhaler              Plan       Orders Placed This Encounter    fluticasone propion-salmeterol (ADVAIR/WIXELA) 500-50 mcg/dose diskus inhaler    albuterol (PROVENTIL HFA, VENTOLIN HFA, PROAIR HFA) 90 mcg/actuation inhaler     - counseled patient on using Advair daily as this is a controller medication  - Refilled advair and albuterol  - he hasn't seen his pulmonologist in a long time. Recommended seeing her at least once a year     Patient discussed with Dr. Neelam Manley    I have discussed the diagnosis with the patient and the intended plan as seen in the above orders. The patient has received an after-visit summary and questions were answered concerning future plans. I have discussed medication side effects and warnings with the patient as well.     Kasey Pickering MD  Family Medicine Resident  PGY-3

## 2019-11-18 NOTE — PATIENT INSTRUCTIONS
Controlling Your Asthma: Care Instructions  Your Care Instructions    Asthma is a long-term condition that affects your breathing. It causes the airways that lead to the lungs to swell. During an asthma attack, the airways swell and narrow. This makes it hard to breathe. You may wheeze or cough. If you have a bad attack, you may need emergency care. There are two things to do to treat asthma. · Control asthma over the long term. · Treat attacks when they occur. You and your doctor can make an asthma action plan. It tells you what medicines you need to take every day to control asthma symptoms and what to do if you have an asthma attack. Your asthma action plan can help prevent and treat attacks. When you keep your asthma under control, you can prevent severe attacks and lasting damage to your airways. You need to treat your asthma even when you are not having symptoms. Although asthma is a lifelong disease, treatment can help control it and help you stay healthy. Follow-up care is a key part of your treatment and safety. Be sure to make and go to all appointments, and call your doctor if you are having problems. It's also a good idea to know your test results and keep a list of the medicines you take. How can you care for yourself at home? To control asthma over the long term  Medicines  Controller medicines reduce swelling in your lungs. They also prevent asthma attacks. Take your controller medicine exactly as prescribed. Talk to your doctor if you have any problems with your medicine. · Inhaled corticosteroid is a common and effective controller medicine. Using it the right way can prevent or reduce most side effects. · Take your controller medicine every day, not just when you have symptoms. It helps prevent problems before they occur. · Your doctor may prescribe another medicine that you use along with the corticosteroid. This is often a long-acting bronchodilator.  Do not take this medicine by itself. Using a long-acting bronchodilator by itself can increase your risk of a severe or fatal asthma attack. · Do not take inhaled corticosteroids or long-acting bronchodilators to stop an asthma attack that has already started. They don't work fast enough to help. · Talk to your doctor before you use other medicines. Some medicines, such as aspirin, can cause asthma attacks in some people. Education  · Learn what triggers an asthma attack. Avoid these triggers when you can. Common triggers include colds, smoke, air pollution, dust, pollen, mold, pets, cockroaches, stress, and cold air. · Check yourself for asthma symptoms to know which step to follow in your action plan. Watch for things like being short of breath, having chest tightness, coughing, and wheezing. Also notice if symptoms wake you up at night or if you get tired quickly when you exercise. · If you have a peak flow meter, use it to check how well you are breathing. It can help you know when an asthma attack is going to occur. Then you can take medicine to prevent the asthma attack or make it less severe. · Do not smoke or allow others to smoke around you. Avoid smoky places. Smoking makes asthma worse. If you need help quitting, talk to your doctor about stop-smoking programs and medicines. These can increase your chances of quitting for good. · Avoid colds and the flu. Get a pneumococcal vaccine shot. If you have had one before, ask your doctor whether you need a second dose. Get a flu vaccine every year, as soon as it's available. If you must be around people with colds or the flu, wash your hands often. To treat attacks when they occur  Use your asthma action plan when you have an attack. Your quick-relief medicine will stop an asthma attack. It relaxes the muscles that get tight around the airways. If your doctor prescribed corticosteroid pills to use during an attack, take them as directed.  They may take hours to work, but they may shorten the attack and help you breathe better. · Albuterol is an effective quick-relief inhaler. · Take your quick-relief medicine exactly as prescribed. · Always bring your asthma medicine with you when you travel. · You may need to use quick-relief medicine before you exercise. · Call your doctor if you think you are having a problem with your medicine. When should you call for help? Call 911 anytime you think you may need emergency care. For example, call if:    · You are having severe trouble breathing.    Call your doctor now or seek immediate medical care if:    · Your symptoms do not get better after you have followed your asthma action plan.     · You cough up yellow, dark brown, or bloody mucus (sputum).    Watch closely for changes in your health, and be sure to contact your doctor if:    · Your coughing and wheezing get worse.     · You need to use your quick-relief medicine on more than 2 days a week (unless it is just for exercise).     · You need help figuring out what is triggering your asthma attacks. Where can you learn more? Go to http://milena-du.info/. Enter V820 in the search box to learn more about \"Controlling Your Asthma: Care Instructions. \"  Current as of: June 9, 2019  Content Version: 12.2  © 7255-8667 [employer]. Care instructions adapted under license by [employer] (which disclaims liability or warranty for this information). If you have questions about a medical condition or this instruction, always ask your healthcare professional. [place] [address] any warranty or liability for your use of this information.

## 2020-02-03 ENCOUNTER — OFFICE VISIT (OUTPATIENT)
Dept: FAMILY MEDICINE CLINIC | Age: 20
End: 2020-02-03

## 2020-02-03 VITALS
RESPIRATION RATE: 17 BRPM | DIASTOLIC BLOOD PRESSURE: 65 MMHG | HEART RATE: 79 BPM | HEIGHT: 67 IN | BODY MASS INDEX: 30.1 KG/M2 | WEIGHT: 191.8 LBS | SYSTOLIC BLOOD PRESSURE: 110 MMHG | OXYGEN SATURATION: 99 % | TEMPERATURE: 97.6 F

## 2020-02-03 DIAGNOSIS — M54.50 ACUTE LEFT-SIDED LOW BACK PAIN WITHOUT SCIATICA: Primary | ICD-10-CM

## 2020-02-03 RX ORDER — NAPROXEN SODIUM 220 MG
220 TABLET ORAL 2 TIMES DAILY WITH MEALS
Qty: 20 TAB | Refills: 1 | Status: SHIPPED | OUTPATIENT
Start: 2020-02-03

## 2020-02-03 RX ORDER — CYCLOBENZAPRINE HCL 10 MG
5 TABLET ORAL
Qty: 7 TAB | Refills: 0 | Status: SHIPPED | OUTPATIENT
Start: 2020-02-03

## 2020-02-03 NOTE — PATIENT INSTRUCTIONS
Acute Low Back Pain: Exercises  Introduction  Here are some examples of typical rehabilitation exercises for your condition. Start each exercise slowly. Ease off the exercise if you start to have pain. Your doctor or physical therapist will tell you when you can start these exercises and which ones will work best for you. When you are not being active, find a comfortable position for rest. Some people are comfortable on the floor or a medium-firm bed with a small pillow under their head and another under their knees. Some people prefer to lie on their side with a pillow between their knees. Don't stay in one position for too long. Take short walks (10 to 20 minutes) every 2 to 3 hours. Avoid slopes, hills, and stairs until you feel better. Walk only distances you can manage without pain, especially leg pain. How to do the exercises  Back stretches    1. Get down on your hands and knees on the floor. 2. Relax your head and allow it to droop. Round your back up toward the ceiling until you feel a nice stretch in your upper, middle, and lower back. Hold this stretch for as long as it feels comfortable, or about 15 to 30 seconds. 3. Return to the starting position with a flat back while you are on your hands and knees. 4. Let your back sway by pressing your stomach toward the floor. Lift your buttocks toward the ceiling. 5. Hold this position for 15 to 30 seconds. 6. Repeat 2 to 4 times. Follow-up care is a key part of your treatment and safety. Be sure to make and go to all appointments, and call your doctor if you are having problems. It's also a good idea to know your test results and keep a list of the medicines you take. Where can you learn more? Go to http://milena-du.info/. Enter E912 in the search box to learn more about \"Acute Low Back Pain: Exercises. \"  Current as of: June 26, 2019  Content Version: 12.2  © 6395-1388 [employer].  Care instructions adapted under license by [employer] (which disclaims liability or warranty for this information). If you have questions about a medical condition or this instruction, always ask your healthcare professional. [address] any warranty or liability for your use of this information.

## 2020-02-03 NOTE — PROGRESS NOTES
Chief Complaint   Patient presents with    Back Pain     x 6 days  patient states having lower back pain that comes and goes . patient states lower back pain has been constant. Blood pressure 110/65, pulse 79, temperature 97.6 °F (36.4 °C), temperature source Oral, resp. rate 17, height 5' 7\" (1.702 m), weight 191 lb 12.8 oz (87 kg), SpO2 99 %. 1. Have you been to the ER, urgent care clinic since your last visit? Hospitalized since your last visit? No    2. Have you seen or consulted any other health care providers outside of the [address] [place] [address] since your last visit? Include any pap smears or colon screening.  No

## 2020-02-03 NOTE — PROGRESS NOTES
Subjective:   History: This patient is a 22 y.o. male with a chief complaint of back pain. Back pain: X 5 days  - left side lower back- pressure like, 7/10  - Pain worse everytime he bendsdown and turns around  - Denies trauma, injury or fall   - Tried stretching, salt bath had tried ibuprofen. No bowel or bladder incontinence. No fever, night sweats, or weight changes. No saddle anesthesia. Review of Systems:  General/Constitutional:  No fever, chills, sweats, fatigue, night sweats, weakness, weight loss or weight gain   Head: No headache, no trauma   Neck: No swelling, masses, stiffness, pain, or limited movement   Cardiac: No chest pain   Respiratory: No cough, shortness of breath, or dyspnea on exertion   GI: No incontinence. No nausea/vomiting, diarrhea, abdominal pain, bloody or dark stools  : No incontinence. No change in urinary habits. Peripheral Vascular: No edema, coldness, numbness, discoloration, pain, or paresthesias   Musculoskeletal: As per HPI  Neurological: No saddle distribution paresthesia. No siatic pain. No loss of consciousness, dizziness, seizures, dysarthria, cognitive changes, problems with balance, or unilateral weakness. Past Medical History:   Diagnosis Date    ADHD (attention deficit hyperactivity disorder)     Asthma     Community acquired pneumonia     H1N1 influenza     Learning disability      Family History   Problem Relation Age of Onset    Cancer Maternal Grandmother     Diabetes Maternal Grandfather     Heart Disease Maternal Grandfather 39        premature CAD age 39, CABG    Heart Disease Paternal Grandfather     Diabetes Paternal Grandfather     Cancer Paternal Grandfather     Asthma Brother     Allergy-severe Mother     Asthma Mother         grew out of     Eczema Mother      Current Outpatient Medications   Medication Sig Dispense Refill    naproxen sodium (ALEVE) 220 mg tablet Take 1 Tab by mouth two (2) times daily (with meals).  20 Tab 1  cyclobenzaprine (FLEXERIL) 10 mg tablet Take 0.5 Tabs by mouth nightly. 7 Tab 0    fluticasone propion-salmeterol (ADVAIR/WIXELA) 500-50 mcg/dose diskus inhaler Take 1 Puff by inhalation every twelve (12) hours. 1 Inhaler 6    albuterol (PROVENTIL HFA, VENTOLIN HFA, PROAIR HFA) 90 mcg/actuation inhaler Take 2 Puffs by inhalation every four (4) hours as needed for Wheezing. Indications: Asthma Attack 2 Inhaler 5    atomoxetine (STRATTERA) 10 mg capsule Take 1 Cap by mouth every morning. 30 Cap 0    montelukast (SINGULAIR) 5 mg chewable tablet Take 1 Tab by mouth nightly. 30 Tab 3    albuterol (PROVENTIL VENTOLIN) 2.5 mg /3 mL (0.083 %) nebulizer solution Take 2.5 mg via neb every 4 hours as needed for cough and wheeze 100 Each 4    fluticasone (FLONASE) 50 mcg/actuation nasal spray Take 1 spray each nostril twice a day 1 Bottle 5    inhalational spacing device 1 Each by Does Not Apply route as needed.  Any brand ok 1 Device 1     Allergies   Allergen Reactions    Measles, Mumps, And Rubella Vaccine Live Anaphylaxis    Pcn [Penicillins] Rash    Peanut Swelling     Mom states not allergic      Social History     Socioeconomic History    Marital status: SINGLE     Spouse name: Not on file    Number of children: Not on file    Years of education: Not on file    Highest education level: Not on file   Occupational History    Not on file   Social Needs    Financial resource strain: Not on file    Food insecurity:     Worry: Not on file     Inability: Not on file    Transportation needs:     Medical: Not on file     Non-medical: Not on file   Tobacco Use    Smoking status: Never Smoker    Smokeless tobacco: Never Used   Substance and Sexual Activity    Alcohol use: No    Drug use: No    Sexual activity: Not Currently   Lifestyle    Physical activity:     Days per week: Not on file     Minutes per session: Not on file    Stress: Not on file   Relationships    Social connections:     Talks on phone: Not on file     Gets together: Not on file     Attends Christian service: Not on file     Active member of club or organization: Not on file     Attends meetings of clubs or organizations: Not on file     Relationship status: Not on file    Intimate partner violence:     Fear of current or ex partner: Not on file     Emotionally abused: Not on file     Physically abused: Not on file     Forced sexual activity: Not on file   Other Topics Concern    Not on file   Social History Narrative    Not on file       Objective:     Visit Vitals  /65   Pulse 79   Temp 97.6 °F (36.4 °C) (Oral)   Resp 17   Ht 5' 7\" (1.702 m)   Wt 191 lb 12.8 oz (87 kg)   SpO2 99%   BMI 30.04 kg/m²       General: Alert and oriented and in no acute distress. Responds to all questions appropriately. LUNGS: Respirations unlabored. Skin: No obvious rash. MSK:    Posture: Normal   Deformity: None    ROM:     Flexion: Normal    Extension: Normal     Lateral bending: Normal      Gait: Normal       Palpation:    L1-L5: No tenderness    Sacrum: No tenderness    Coccyx: No tenderness    Left Paraspinal: Tenderness    Right Paraspinal: No tenderness     Strength (0-5/5)    Hip Flexion:   Left: 5/5  Right: 5/5    Hip Extension:  Left: 5/5  Right: 5/5    Hip Abduction:  Left: 5/5  Right: 5/5    Hip Adduction:  Left: 5/5  Right: 5/5    Knee Extension:  Left: 5/5  Right: 5/5    Knee Flexion:   Left: 5/5  Right: 5/5    Ankle dorsiflexion:  Left: 5/5  Right: 5/5    Ankle plantarflexion:  Left: 5/5  Right: 5/5    Great toe extension:  Left: 5/5  Right: 5/5     Sensation: Intact, no deficits      DTR:    Patella:  Left: +2  Right: +2    Achilles:  Left: +2  Right: +2     Special test:    Straight leg: Left: Negative  Right: Negative    Maynors: Left: Negative  Right: Negative    Piriformis: Left: Negative  Right: Negative               Assessment:       ICD-10-CM ICD-9-CM    1.  Acute left-sided low back pain without sciatica M54.5 724.2 Normal ROM. Tenderness in the left lumbar  paraspinal muscles likey from Muscle strain. Plan:   1. Home Exercise Program as per handout. 2. Ice 15 minutes, three times a day PRN and after exercise. Can alternate with heat for 15 minutes. Medications:    1. Naproxin (Aleve): 220mg 1-2 tablets twice a day PRN. 2. Acetaminophen (Tylenol):  500mg 1-2 tablets every 6 hours as needed for pain. 3. Flexeril 10 mg - take 1 tablet three times a day as needed for spasm . Patient was warned that this medication can cause drowsiness and use of automobiles or machinery should not be performed while taking this medicine. RTC: in 2 weeks if symptoms worsen or  fail to improve.

## 2020-02-03 NOTE — LETTER
NOTIFICATION RETURN TO WORK  
 
2/3/2020 4:16 PM 
 
Mr. Shine Leavitt [facility] [address] [address] To Whom It May Concern: 
 
Shine Leavitt is currently under the care of [facility]. He was seen in the office today 2/3/2020 If there are questions or concerns please have the patient contact our office.  
 
 
 
Sincerely, 
 
 
Josue Bear MD

## 2020-03-05 DIAGNOSIS — J45.51 SEVERE PERSISTENT ASTHMA WITH ACUTE EXACERBATION: ICD-10-CM

## 2020-03-09 RX ORDER — ALBUTEROL SULFATE 0.83 MG/ML
SOLUTION RESPIRATORY (INHALATION)
Qty: 100 EACH | Refills: 4 | Status: SHIPPED | OUTPATIENT
Start: 2020-03-09 | End: 2020-12-08 | Stop reason: SDUPTHER

## 2020-03-09 NOTE — TELEPHONE ENCOUNTER
I saw patient last for acute back pain. Patient was seen previously for asthma by Dr. Bell García and routing this to her. Thank you!

## 2020-07-27 DIAGNOSIS — J45.40 MODERATE PERSISTENT ASTHMA WITHOUT COMPLICATION: ICD-10-CM

## 2020-07-27 RX ORDER — ALBUTEROL SULFATE 90 UG/1
2 AEROSOL, METERED RESPIRATORY (INHALATION)
Qty: 2 INHALER | Refills: 5 | Status: SHIPPED | OUTPATIENT
Start: 2020-07-27 | End: 2021-02-05 | Stop reason: SDUPTHER

## 2020-12-08 ENCOUNTER — OFFICE VISIT (OUTPATIENT)
Dept: FAMILY MEDICINE CLINIC | Age: 20
End: 2020-12-08
Payer: MEDICAID

## 2020-12-08 VITALS
SYSTOLIC BLOOD PRESSURE: 109 MMHG | RESPIRATION RATE: 16 BRPM | WEIGHT: 191.4 LBS | OXYGEN SATURATION: 97 % | BODY MASS INDEX: 30.04 KG/M2 | TEMPERATURE: 97 F | HEIGHT: 67 IN | HEART RATE: 74 BPM | DIASTOLIC BLOOD PRESSURE: 70 MMHG

## 2020-12-08 DIAGNOSIS — F90.0 ATTENTION DEFICIT HYPERACTIVITY DISORDER (ADHD), PREDOMINANTLY INATTENTIVE TYPE: ICD-10-CM

## 2020-12-08 DIAGNOSIS — J45.40 MODERATE PERSISTENT ASTHMA WITHOUT COMPLICATION: Primary | ICD-10-CM

## 2020-12-08 PROCEDURE — 99214 OFFICE O/P EST MOD 30 MIN: CPT | Performed by: STUDENT IN AN ORGANIZED HEALTH CARE EDUCATION/TRAINING PROGRAM

## 2020-12-08 RX ORDER — ATOMOXETINE 40 MG/1
40 CAPSULE ORAL
Qty: 30 CAP | Refills: 3 | Status: SHIPPED | OUTPATIENT
Start: 2020-12-08

## 2020-12-08 RX ORDER — ALBUTEROL SULFATE 0.83 MG/ML
SOLUTION RESPIRATORY (INHALATION)
Qty: 100 EACH | Refills: 4 | Status: SHIPPED | OUTPATIENT
Start: 2020-12-08

## 2020-12-08 RX ORDER — FLUTICASONE PROPIONATE AND SALMETEROL 500; 50 UG/1; UG/1
1 POWDER RESPIRATORY (INHALATION) EVERY 12 HOURS
Qty: 1 INHALER | Refills: 6 | Status: SHIPPED | OUTPATIENT
Start: 2020-12-08

## 2020-12-08 NOTE — PROGRESS NOTES
History of Present Illness:     Chief Complaint   Patient presents with    Asthma     Shortness of breath due to asthma. He had COVID in august .He wanted to see if he could get ADHD medication amadeo freire is about to go to school. No other concerns. Sierra Tom is a 23 y.o. male who is being seen today for:     Asthma: Presents for medicine refill for asthma. Pt is currently only using his albuterol inhaler since he ran out of Advair inhaler and nebs. Has been out of Advair since July & nebs since August. Has been using his albuterol inhaler 3X a day and wakes up once nightly due to asthma. Denies any cough or wheezing. ADHD: Pt used to be on Strattera. He stopped it because he was not in any classes but he would like to start Strattera again since he is now taking classes. Strattera was last prescribed at our clinic but prior to that was prescribed by his psychiatrist. He does not see the psychiatrist anymore. PMH (REVIEWED):  Past Medical History:   Diagnosis Date    ADHD (attention deficit hyperactivity disorder)     Asthma     Community acquired pneumonia     H1N1 influenza     Learning disability        Current Medications/Allergies (REVIEWED):     Current Outpatient Medications on File Prior to Visit   Medication Sig Dispense Refill    albuterol (PROVENTIL HFA, VENTOLIN HFA, PROAIR HFA) 90 mcg/actuation inhaler Take 2 Puffs by inhalation every four (4) hours as needed for Wheezing. Indications: asthma attack 2 Inhaler 5    albuterol (PROVENTIL VENTOLIN) 2.5 mg /3 mL (0.083 %) nebu Take 2.5 mg via neb every 4 hours as needed for cough and wheeze 100 Each 4    naproxen sodium (ALEVE) 220 mg tablet Take 1 Tab by mouth two (2) times daily (with meals). 20 Tab 1    fluticasone propion-salmeterol (ADVAIR/WIXELA) 500-50 mcg/dose diskus inhaler Take 1 Puff by inhalation every twelve (12) hours. 1 Inhaler 6    montelukast (SINGULAIR) 5 mg chewable tablet Take 1 Tab by mouth nightly.  30 Tab 3  inhalational spacing device 1 Each by Does Not Apply route as needed. Any brand ok 1 Device 1    cyclobenzaprine (FLEXERIL) 10 mg tablet Take 0.5 Tabs by mouth nightly. 7 Tab 0    atomoxetine (STRATTERA) 10 mg capsule Take 1 Cap by mouth every morning. 30 Cap 0    fluticasone (FLONASE) 50 mcg/actuation nasal spray Take 1 spray each nostril twice a day 1 Bottle 5     No current facility-administered medications on file prior to visit. Allergies   Allergen Reactions    Measles, Mumps, And Rubella Vaccine Live Anaphylaxis    Pcn [Penicillins] Rash    Peanut Swelling     Mom states not allergic          Review of Systems:     Review of Systems   Constitutional: Negative for chills and fever. HENT: Negative for congestion and sore throat. Respiratory: Negative for cough and shortness of breath. Cardiovascular: Negative for chest pain and palpitations. Gastrointestinal: Negative for diarrhea, nausea and vomiting. Psychiatric/Behavioral: Negative for depression and suicidal ideas. Objective:     Vitals:    12/08/20 1430   BP: 109/70   Pulse: 74   Resp: 16   Temp: 97 °F (36.1 °C)   TempSrc: Temporal   SpO2: 97%   Weight: 191 lb 6.4 oz (86.8 kg)   Height: 5' 7\" (1.702 m)       Physical Exam  Constitutional:       Appearance: He is well-developed. Neck:      Musculoskeletal: Normal range of motion and neck supple. Cardiovascular:      Rate and Rhythm: Normal rate and regular rhythm. Heart sounds: No murmur. No friction rub. Pulmonary:      Effort: Pulmonary effort is normal.      Breath sounds: Normal breath sounds. Skin:     General: Skin is warm and dry. Neurological:      Mental Status: He is alert and oriented to person, place, and time. Recent Labs:  No results found for this or any previous visit (from the past 12 hour(s)). Assessment and Plan:     Attention deficit hyperactivity disorder (ADHD), predominantly inattentive type: Previously on Strattera.  Used to see psychiatrist (Dr. Ann Melchor) but does not see her anymore due to scheduling difficulty   - atomoxetine (STRATTERA) 40 mg capsule; Take 1 Cap by mouth every morning. Dispense: 30 Cap; Refill: 3  - TM follow up in 1 month     Moderate persistent asthma without complication:   - albuterol (PROVENTIL VENTOLIN) 2.5 mg /3 mL (0.083 %) nebu; Take 2.5 mg via neb every 4 hours as needed for cough and wheeze  Dispense: 100 Each; Refill: 4  - fluticasone propion-salmeteroL (ADVAIR/WIXELA) 500-50 mcg/dose diskus inhaler; Take 1 Puff by inhalation every twelve (12) hours. Dispense: 1 Inhaler; Refill: 6  - Declined flu vaccine    Orvan Burkitt, DO    We discussed the expected course, resolution and complications of the diagnosis(es) in detail. Medication risks, benefits, costs, interactions, and alternatives were discussed as indicated. I advised him to contact the office if his condition worsens, changes or fails to improve as anticipated. He expressed understanding with the diagnosis(es) and plan.

## 2020-12-08 NOTE — PROGRESS NOTES
Abelardo Teague is a 23 y.o. male    Chief Complaint   Patient presents with    Asthma     Shortness of breath due to asthma. He had COVID in august .He wanted to see if he could get ADHD medication amadeo freire is about to go to school. No other concerns. 1. Have you been to the ER, urgent care clinic since your last visit? Hospitalized since your last visit? No  M  2. Have you seen or consulted any other health care providers outside of the [address] [place] [address] since your last visit? Include any pap smears or colon screening. No      Visit Vitals  /70 (BP 1 Location: Right arm, BP Patient Position: Sitting)   Pulse 74   Temp 97 °F (36.1 °C) (Temporal)   Resp 16   Ht 5' 7\" (1.702 m)   Wt 191 lb 6.4 oz (86.8 kg)   SpO2 97%   BMI 29.98 kg/m²           Health Maintenance Due   Topic Date Due    DTaP/Tdap/Td series (7 - Td) 05/18/2020    Flu Vaccine (1) 09/01/2020         Medication Reconciliation completed, changes noted.   Please  Update medication list.

## 2020-12-16 ENCOUNTER — TELEPHONE (OUTPATIENT)
Dept: FAMILY MEDICINE CLINIC | Age: 20
End: 2020-12-16

## 2020-12-16 RX ORDER — IPRATROPIUM BROMIDE AND ALBUTEROL SULFATE 2.5; .5 MG/3ML; MG/3ML
3 SOLUTION RESPIRATORY (INHALATION)
Qty: 30 NEBULE | Refills: 3 | Status: SHIPPED | OUTPATIENT
Start: 2020-12-16

## 2020-12-16 NOTE — TELEPHONE ENCOUNTER
----- Message from Juarezmaria dolores Garzon sent at 12/11/2020  2:33 PM EST -----  Regarding: Dr. Kali Staley (if not patient):n/a  Relationship of caller (if not patient): awld  Best contact number(s):[phone]  Name of medication and dosage if known: see below  Is patient out of this medication (yes/no): yes  Pharmacy name: [facility] listed in chart? (yes/no): yes  Pharmacy phone number: unknown  Date of last visit:12/8/20  Details:Mr. Rose Rasheed is calling to advise when he went to the pharmacy 2 of his Rx \"Allburteral and Dual Meds\" was not at the [facility] to .   Mr. Rose Rasheed states if he doesn't get them today he will have to go to the hospital.

## 2020-12-16 NOTE — TELEPHONE ENCOUNTER
After speaking with pharmacy and patient I got more information. Patient wants a RX for duo-nebs. He says he uses them PRN for asthma flares and that he would like to have some on hand in if he had an acute exacerbation out of town to avoid an ER visit (he is going to [place] soon and the elevation differences triggers his asthma). I made him aware that this was not discussed in the last visit with Dr. Edda Lima and that I would have to forward this message to her to see if she would be willing to RX this for him. Patient verbalized understanding.

## 2020-12-23 ENCOUNTER — TELEPHONE (OUTPATIENT)
Dept: FAMILY MEDICINE CLINIC | Age: 20
End: 2020-12-23

## 2021-02-05 DIAGNOSIS — J45.40 MODERATE PERSISTENT ASTHMA WITHOUT COMPLICATION: ICD-10-CM

## 2021-02-05 RX ORDER — ALBUTEROL SULFATE 90 UG/1
2 AEROSOL, METERED RESPIRATORY (INHALATION)
Qty: 2 INHALER | Refills: 5 | Status: SHIPPED | OUTPATIENT
Start: 2021-02-05

## 2021-03-12 ENCOUNTER — TELEPHONE (OUTPATIENT)
Dept: FAMILY MEDICINE CLINIC | Age: 21
End: 2021-03-12

## 2021-03-12 NOTE — TELEPHONE ENCOUNTER
Good afternoon  I called this pt to get them checked in for the 3pm VV with you and pt stated he is in the hospital right now . Appt has been canceled and he said he will call back to schedule a hospital follow up as well as to get med refills and discuss a possible ulcer . Pt states he is in the hospital because he started vomiting blood this morning.

## 2022-03-20 PROBLEM — F41.9 ANXIETY: Status: ACTIVE | Noted: 2017-02-01

## 2022-03-20 PROBLEM — F32.A DEPRESSION: Status: ACTIVE | Noted: 2017-02-01

## 2022-11-09 ENCOUNTER — OFFICE VISIT (OUTPATIENT)
Dept: FAMILY MEDICINE CLINIC | Age: 22
End: 2022-11-09
Payer: COMMERCIAL

## 2022-11-09 VITALS
RESPIRATION RATE: 16 BRPM | DIASTOLIC BLOOD PRESSURE: 66 MMHG | TEMPERATURE: 99.3 F | OXYGEN SATURATION: 97 % | WEIGHT: 199.8 LBS | HEART RATE: 104 BPM | SYSTOLIC BLOOD PRESSURE: 105 MMHG | HEIGHT: 67 IN | BODY MASS INDEX: 31.36 KG/M2

## 2022-11-09 DIAGNOSIS — J06.9 URI WITH COUGH AND CONGESTION: Primary | ICD-10-CM

## 2022-11-09 DIAGNOSIS — J45.41 MODERATE PERSISTENT ASTHMA WITH ACUTE EXACERBATION: ICD-10-CM

## 2022-11-09 DIAGNOSIS — R07.89 OTHER CHEST PAIN: ICD-10-CM

## 2022-11-09 DIAGNOSIS — J30.89 SEASONAL ALLERGIC RHINITIS DUE TO OTHER ALLERGIC TRIGGER: ICD-10-CM

## 2022-11-09 PROCEDURE — 93000 ELECTROCARDIOGRAM COMPLETE: CPT | Performed by: PHYSICIAN ASSISTANT

## 2022-11-09 PROCEDURE — 99204 OFFICE O/P NEW MOD 45 MIN: CPT | Performed by: PHYSICIAN ASSISTANT

## 2022-11-09 RX ORDER — FLUTICASONE PROPIONATE 50 MCG
SPRAY, SUSPENSION (ML) NASAL
Qty: 1 EACH | Refills: 2 | Status: SHIPPED | OUTPATIENT
Start: 2022-11-09

## 2022-11-09 RX ORDER — AZITHROMYCIN 250 MG/1
TABLET, FILM COATED ORAL
Qty: 6 TABLET | Refills: 0 | Status: SHIPPED | OUTPATIENT
Start: 2022-11-09

## 2022-11-09 RX ORDER — MONTELUKAST SODIUM 5 MG/1
5 TABLET, CHEWABLE ORAL
Qty: 30 TABLET | Refills: 2 | Status: SHIPPED | OUTPATIENT
Start: 2022-11-09

## 2022-11-09 RX ORDER — PREDNISONE 20 MG/1
20 TABLET ORAL 3 TIMES DAILY
Qty: 12 TABLET | Refills: 0 | Status: SHIPPED | OUTPATIENT
Start: 2022-11-09 | End: 2022-11-13

## 2022-11-09 RX ORDER — IPRATROPIUM BROMIDE AND ALBUTEROL SULFATE 2.5; .5 MG/3ML; MG/3ML
3 SOLUTION RESPIRATORY (INHALATION)
Qty: 30 EACH | Refills: 0 | Status: SHIPPED | OUTPATIENT
Start: 2022-11-09

## 2022-11-09 NOTE — MR AVS SNAPSHOT
I have noted the impression of the CT of the abdomen and pelvis 11/9/22 below:  \"*   5.6 cm heterogeneous mass in segment 4 the liver extending near to the  confluence of right and left hepatic ducts and the german hepatis.   Associated intrahepatic biliary ductal dilatation primarily involving the  left lobe.  Finding is highly suspicious for primary hepatic neoplasm with  cholangiocarcinoma considered most likely unless patient has factors  predisposing to hepatocellular carcinoma.  *   Diffuse urinary bladder wall thickening. Correlate for evidence of  urinary tract infection.\"    These findings were discussed with you. Her liver enzymes were also elevated. It is your prerogative on whether you'd like to pursue additional workup and follow-up as an outpatient. Please return to the ED for any worsening or concerning symptoms.   Visit Information Date & Time Provider Department Dept. Phone Encounter #  
 8/4/2017 10:15 AM Lamberto Duque MD [facility] [phone] [id number] Upcoming Health Maintenance Date Due  
 HPV AGE 9Y-34Y (1 of 3 - Male 3 Dose Series) 6/13/2009 INFLUENZA AGE 9 TO ADULT 8/1/2017 DTaP/Tdap/Td series (6 - Td) 5/18/2020 Allergies as of 8/4/2017  Review Complete On: 8/4/2017 By: Edvin Hogan LPN Severity Noted Reaction Type Reactions Measles, Mumps, And Rubella Vaccine Live High 11/18/2013    Anaphylaxis Pcn [Penicillins]  11/12/2013    Rash Peanut Low 11/12/2013   Not Verified Swelling Mom states not allergic Current Immunizations  Reviewed on 2/9/2015 Name Date DTaP 1/12/2000, 4/8/1999, 2/4/1999, 1998 Hep A Vaccine 5/6/2009 Hep A Vaccine 2 Dose Schedule (Ped/Adol) 10/21/2015  6:16 PM  
 Hep B Vaccine 2/4/1999, 1998, 1998 Hib 1/12/2000, 4/8/1999, 2/4/1999, 1998 Influenza Vaccine 9/20/2013, 12/10/2012, 9/30/2009, 10/23/2008, 11/29/2006, 10/10/2005 Influenza Vaccine (Quad) PF 11/3/2014 MMR 9/7/1999 Meningococcal (MPSV4) Vaccine 10/21/2015  6:14 PM  
 Meningococcal ACWY Vaccine 5/18/2010 Pneumococcal Vaccine (Unspecified Type) 8/31/2000 Poliovirus vaccine 7/28/2009, 9/7/1999, 2/4/1999, 1998 Tdap 5/18/2010, 5/6/2009 Varicella Virus Vaccine 1/12/2000 Not reviewed this visit You Were Diagnosed With   
  
 Codes Comments Moderate persistent asthma with acute exacerbation     ICD-10-CM: J45.41 
ICD-9-CM: 493.92 Attention deficit hyperactivity disorder (ADHD), unspecified ADHD type     ICD-10-CM: F90.9 ICD-9-CM: 314.01 Vitals BP Pulse Temp Resp Height(growth percentile) Weight(growth percentile) 98/64 (2 %/ 20 %)* (!) 52 95.2 °F (35.1 °C) (Oral) 18 5' 7\" (1.702 m) (18 %, Z= -0.90) 168 lb (76.2 kg) (71 %, Z= 0.56) SpO2 BMI Smoking Status 95% 26.31 kg/m2 (84 %, Z= 1.01) Never Smoker *BP percentiles are based on NHBPEP's 4th Report Growth percentiles are based on CDC 2-20 Years data. Vitals History BMI and BSA Data Body Mass Index Body Surface Area  
 26.31 kg/m 2 1.9 m 2 Preferred Pharmacy Pharmacy Name Phone [facility] [address], [address] [phone] Your Updated Medication List  
  
   
This list is accurate as of: 8/4/17 11:35 AM.  Always use your most recent med list.  
  
  
  
  
 * albuterol 90 mcg/actuation inhaler Commonly known as:  PROVENTIL HFA, VENTOLIN HFA, PROAIR HFA Take 2 Puffs by inhalation every four (4) hours as needed for Wheezing. One for home and one for school  Indications: Acute Asthma Attack * albuterol 2.5 mg /3 mL (0.083 %) nebulizer solution Commonly known as:  PROVENTIL VENTOLIN Take 2.5 mg via neb every 4 hours as needed for cough and wheeze EPINEPHrine 0.3 mg/0.3 mL injection Commonly known as:  EPIPEN 2-RASHEED  
0.3 mL by IntraMUSCular route once as needed for up to 1 dose. fluticasone 50 mcg/actuation nasal spray Commonly known as:  [place] End Take 1 spray each nostril twice a day  
  
 fluticasone-salmeterol 500-50 mcg/dose diskus inhaler Commonly known as:  ADVAIR DISKUS INHALE ONE DOSE BY MOUTH EVERY 12 HOURS  
  
 inhalational spacing device 1 Each by Does Not Apply route as needed. Any brand ok  
  
 loratadine 10 mg tablet Commonly known as:  [place] Pain Take 1 Tab by mouth daily. methylphenidate HCl 36 mg CR tablet Commonly known as:  CONCERTA Take 1 Tab (36 mg total) by mouth dailyEarliest Fill Date: 10/23/17. Max Daily Amount: 36 mg  
Start taking on:  10/23/2017 * Notice: This list has 2 medication(s) that are the same as other medications prescribed for you. Read the directions carefully, and ask your doctor or other care provider to review them with you. Prescriptions Printed Refills  
 methylphenidate ER 36 mg 24 hr tab 0 Starting on: 10/23/2017 Sig: Take 1 Tab (36 mg total) by mouth dailyEarliest Fill Date: 10/23/17. Max Daily Amount: 36 mg  
 Class: Print Route: Oral  
  
Prescriptions Sent to Pharmacy Refills  
 albuterol (PROVENTIL HFA, VENTOLIN HFA, PROAIR HFA) 90 mcg/actuation inhaler 4 Sig: Take 2 Puffs by inhalation every four (4) hours as needed for Wheezing. One for home and one for school  Indications: Acute Asthma Attack Class: Normal  
 Pharmacy: [facility] [address], [address] Ph #: [phone] Route: Inhalation  
 albuterol (PROVENTIL VENTOLIN) 2.5 mg /3 mL (0.083 %) nebulizer solution 4 Sig: Take 2.5 mg via neb every 4 hours as needed for cough and wheeze Class: Normal  
 Pharmacy: [address] Ph #: [phone]  
 fluticasone-salmeterol (ADVAIR DISKUS) 500-50 mcg/dose diskus inhaler 6 Sig: INHALE ONE DOSE BY MOUTH EVERY 12 HOURS Class: Normal  
 Pharmacy: [facility] [address], [address] Ph #: [phone] Patient Instructions Attention Deficit Hyperactivity Disorder (ADHD) in Adults: Care Instructions Your Care Instructions Attention deficit hyperactivity disorder, or ADHD, is a condition that makes it hard to pay attention. So you may have problems when you try to focus, get organized, and finish tasks. It might make you more active than other people. Or you might do things without thinking first. 
ADHD is very common. It usually starts in early childhood. Many adults don't realize they have it until their children are diagnosed. Then they become aware of their own symptoms. Doctors don't know what causes ADHD. But it often runs in families. ADHD can be treated with medicines, behavior training, and counseling. Treatment can improve your life. Follow-up care is a key part of your treatment and safety. Be sure to make and go to all appointments, and call your doctor if you are having problems. It's also a good idea to know your test results and keep a list of the medicines you take. How can you care for yourself at home? · Learn all you can about ADHD. This will help you and your family understand it better. · Take your medicines exactly as prescribed. Call your doctor if you think you are having a problem with your medicine. You will get more details on the specific medicines your doctor prescribes. · If you miss a dose of your medicine, do not take an extra dose. · If your doctor suggests counseling, find a counselor you like and trust. Talk openly and honestly. Be willing to make some changes. · Find a support group for adults with ADHD. Talking to others with the same problems can help you feel better. It can also give you ideas about how to best cope with the condition. · Get rid of distractions at your work space. Keep your desk clean. Try not to face a window or busy hallway. · Use files, planners, and other tools to keep you organized. · Limit use of alcohol, and do not use illegal drugs. People with ADHD tend to become addicted more easily than others. Tell your doctor if you need help to quit. Counseling, support groups, and sometimes medicines can help you stay free of alcohol or drugs. · Get at least 30 minutes of physical activity on most days of the week. Exercise has been shown to help people cope with ADHD. Walking is a good choice. You also may want to do other activities, such as running, swimming, cycling, or playing tennis or team sports. When should you call for help? Watch closely for changes in your health, and be sure to contact your doctor if: 
· You feel sad a lot or cry all the time. · You have trouble sleeping, or you sleep too much. · You find it hard to concentrate, make decisions, or remember things. · You change how you normally eat. · You feel guilty for no reason. Where can you learn more? Go to http://milena-du.info/. Enter B196 in the search box to learn more about \"Attention Deficit Hyperactivity Disorder (ADHD) in Adults: Care Instructions. \" Current as of: July 26, 2016 Content Version: 11.3 © 1095-5480 [employer]. Care instructions adapted under license by [employer] (which disclaims liability or warranty for this information). If you have questions about a medical condition or this instruction, always ask your healthcare professional. [place] [address] any warranty or liability for your use of this information. Asthma in Teens: Care Instructions Your Care Instructions During an asthma attack, your airways swell and narrow as a reaction to certain things (triggers). This makes it hard to breathe. You may be able to prevent asthma attacks if you avoid the things that set off your asthma symptoms. Keeping your asthma under control and treating symptoms before they get bad can help you avoid severe attacks. If you can control your asthma, you may be able to do all of your normal daily activities. You may also avoid asthma attacks and trips to the hospital. 
Follow-up care is a key part of your treatment and safety. Be sure to make and go to all appointments, and call your doctor if you are having problems. It's also a good idea to know your test results and keep a list of the medicines you take. How can you care for yourself at home? · Follow your asthma action plan so you can manage your symptoms at home. An asthma action plan will help you prevent and control airway reactions and will tell you what to do during an asthma attack. If you do not have an asthma action plan, work with your doctor to build one. · Take your asthma medicine exactly as prescribed.  Medicine plays an important role in controlling asthma. Talk to your doctor right away if you have any questions about what to take and how to take it. ¨ Use your quick-relief medicine when you have symptoms of an attack. Quick-relief medicine often is an albuterol inhaler. Some people need to use quick-relief medicine before they exercise. ¨ Take your controller medicine every day, not just when you have symptoms. Controller medicine is usually an inhaled corticosteroid. The goal is to prevent problems before they occur. Do not use your controller medicine to try to treat an attack that has already started. It does not work fast enough to help. ¨ If your doctor prescribed corticosteroid pills to use during an attack, take them as directed. They may take hours to work, but they may shorten the attack and help you breathe better. ¨ Keep your medicines with you at all times. · Talk to your doctor before using other medicines. Some medicines, such as aspirin, can cause asthma attacks in some people. · If you have a peak flow meter, use it to check how well you are breathing. This can help you predict when an asthma attack is going to occur. Then you can take medicine to prevent the asthma attack or make it less severe. · See your doctor regularly. These visits will help you learn more about asthma and what you can do to control it. Your doctor will monitor your treatment to make sure the medicine is helping you. · Keep track of your asthma attacks and your treatment. After you have had an attack, write down what triggered it, what helped end it, and any concerns you have about your asthma action plan. Take your diary when you see your doctor. You can then review your asthma action plan and decide if it is working. · Do not smoke or allow others to smoke around you. Avoid smoky places. Smoking makes asthma worse.  If you need help quitting, talk to your doctor about stop-smoking programs and medicines. These can increase your chances of quitting for good. · Learn what triggers an asthma attack for you, and avoid the triggers when you can. Common triggers include colds, smoke, air pollution, dust, pollen, mold, pets, cockroaches, stress, and cold air. · Avoid colds and the flu. Get a flu vaccine every year, as soon as it is available. If you must be around people with colds or the flu, wash your hands often. When should you call for help? Call 911 anytime you think you may need emergency care. For example, call if: 
· You have severe trouble breathing. Call your doctor now or seek immediate medical care if: 
· Your symptoms do not get better after you have followed your asthma action plan. · You cough up yellow, dark brown, or bloody mucus (sputum). Watch closely for changes in your health, and be sure to contact your doctor if: 
· Your coughing and wheezing get worse. · You need to use quick-relief medicine on more than 2 days a week (unless it is just for exercise). · You need help figuring out what is triggering your asthma attacks. Where can you learn more? Go to http://milena-du.info/. Enter C107 in the search box to learn more about \"Asthma in Teens: Care Instructions. \" Current as of: March 25, 2017 Content Version: 11.3 © 3482-7858 [employer]. Care instructions adapted under license by [employer] (which disclaims liability or warranty for this information). If you have questions about a medical condition or this instruction, always ask your healthcare professional. [place] [address] any warranty or liability for your use of this information. Introducing [facility]! Jeovanny Kilpatrick introduces [employer] patient portal. Now you can access parts of your medical record, email your doctor's office, and request medication refills online.    
 
1. In your internet browser, go to [email or web address] 2. Click on the First Time User? Click Here link in the Sign In box. You will see the New Member Sign Up page. 3. Enter your Voicendo Access Code exactly as it appears below. You will not need to use this code after youve completed the sign-up process. If you do not sign up before the expiration date, you must request a new code. · Voicendo Access Code: [email or web address] Expires: 11/2/2017 11:35 AM 
 
4. Enter the last four digits of your Social Security Number (xxxx) and Date of Birth (mm/dd/yyyy) as indicated and click Submit. You will be taken to the next sign-up page. 5. Create a ClearPoint Metricst ID. This will be your Voicendo login ID and cannot be changed, so think of one that is secure and easy to remember. 6. Create a Voicendo password. You can change your password at any time. 7. Enter your Password Reset Question and Answer. This can be used at a later time if you forget your password. 8. Enter your e-mail address. You will receive e-mail notification when new information is available in [address] 9. Click Sign Up. You can now view and download portions of your medical record. 10. Click the Download Summary menu link to download a portable copy of your medical information. If you have questions, please visit the Frequently Asked Questions section of the Voicendo website. Remember, Voicendo is NOT to be used for urgent needs. For medical emergencies, dial 911. Now available from your iPhone and Android! Please provide this summary of care documentation to your next provider. Your primary care clinician is listed as Maru Monday. If you have any questions after today's visit, please call [phone].

## 2022-11-09 NOTE — PROGRESS NOTES
1. \"Have you been to the ER, urgent care clinic since your last visit? Hospitalized since your last visit? \" Yes Where: [facility]    2. \"Have you seen or consulted any other health care providers outside of the [address] [place] [address] since your last visit? \" Yes Where: [facility] in [place]      3. For patients aged 39-70: Has the patient had a colonoscopy / FIT/ Cologuard? NA - based on age      If the patient is female:    4. For patients aged 41-77: Has the patient had a mammogram within the past 2 years? NA - based on age or sex      11. For patients aged 21-65: Has the patient had a pap smear?  NA - based on age or sex

## 2022-11-09 NOTE — PROGRESS NOTES
Popeye Cabrera is a 23 y.o. male who presents to the office today with the following:  Chief Complaint   Patient presents with    [address] [place] [address] follow up. Has had RVS and now he feels congested again. Just does not feel good. His job and relationship are on the line. He is on the verge of just breaking down. Follow-up  Pt initially scheduled apt for ADHD medication. Was told he would need to see a psychiatrist.  Has therapist he goes to for anxiety. No depression or other psych complaints. He will find a specialist.    He tells me he is actually here because he has been feeling unwell. Started with flu like symptoms last Tues with body aches, night sweats, malaise/fatigue, and HAs. HAs described as all over head, throbbing, associated with nausea and light sensitivity. No fever. Had woken with neck pain the week prior, thought just slept wrong, got better. Seen at Urgent care this past Saturday. Had negative flu and COVID test.  Was given cyclobenzaprine for his neck pain but no other medications. The next day he fainted after his shower. Adonis took to ER. Seen at [facility] on 11/6/22. NO records currently available. Was checked for \"blood clots for some high reading in my labs\"  Also had CT done of neck/heat/chest (pt fell when passed out so they were reportedly looking for injury), CXR, BW all reportedly unremarkable. Told he likely had viral URI and sent home. Was not given any medication. After he left ER he tried a cyclobenzaprine and went to bed. Middle of night his chest got really tight and felt a squeezing central discomfort that was worse with certain positions, he tossed and turned and was finally able to return to sleep. Resolved on its own after several minutes. He has not had any other episodes of chest pain. He has no h/o heart dz. His MGF had heart problems. He is currently using duoneb TID, twice at night. Albuterol 3 x a day. Also on advair BID. Has ran out of singulair and Flonase and needs refilled, says this time of year is the \"peak of his asthma\"  Did not have so many issues while living in [place]. Moved back here several months ago. He has asthma and feels like he is not doing better. Feels he is starting to accumulate mucus in chest.  Hard to function w/o Tylenol. Pt goes to asthma/allergist specialist in [place]. Does not have PCP. \"This was the only place I could get in\"    ROS  See HPI. Past Medical History:   Diagnosis Date    ADHD (attention deficit hyperactivity disorder)     Asthma     Community acquired pneumonia     H1N1 influenza     Learning disability        History reviewed. No pertinent surgical history. 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 fluticasone propionate (Flonase) 50 mcg/actuation nasal spray Take 1 spray each nostril twice a day    montelukast (Singulair) 5 mg chewable tablet Take 1 Tablet by mouth nightly. albuterol-ipratropium (DUO-NEB) 2.5 mg-0.5 mg/3 ml nebu 3 mL by Nebulization route every four (4) hours as needed for Wheezing. azithromycin (ZITHROMAX) 250 mg tablet Take 2 tablets today, then take 1 tablet daily    predniSONE (DELTASONE) 20 mg tablet Take 1 Tablet by mouth three (3) times daily for 4 days. albuterol (PROVENTIL HFA, VENTOLIN HFA, PROAIR HFA) 90 mcg/actuation inhaler Take 2 Puffs by inhalation every four (4) hours as needed for Wheezing.  Indications: asthma attack    albuterol (PROVENTIL VENTOLIN) 2.5 mg /3 mL (0.083 %) nebu Take 2.5 mg via neb every 4 hours as needed for cough and wheeze    fluticasone propion-salmeteroL (ADVAIR/WIXELA) 500-50 mcg/dose diskus inhaler Take 1 Puff by inhalation every twelve (12) hours. inhalational spacing device 1 Each by Does Not Apply route as needed. Any brand ok    atomoxetine (STRATTERA) 40 mg capsule Take 1 Cap by mouth every morning. (Patient not taking: Reported on 11/9/2022)     No current facility-administered medications for this visit. Social History     Socioeconomic History    Marital status: SINGLE   Tobacco Use    Smoking status: Never    Smokeless tobacco: Never   Substance and Sexual Activity    Alcohol use: No    Drug use: No    Sexual activity: Not Currently       Family History   Problem Relation Age of Onset    Cancer Maternal Grandmother     Diabetes Maternal Grandfather     Heart Disease Maternal Grandfather 39        premature CAD age 39, CABG    Heart Disease Paternal Grandfather     Diabetes Paternal Grandfather     Cancer Paternal Grandfather     Asthma Brother     Allergy-severe Mother     Asthma Mother         grew out of     Eczema Mother          Physical Exam:  Visit Vitals  /66 (BP 1 Location: Right arm, BP Patient Position: Sitting, BP Cuff Size: Adult)   Pulse (!) 104   Temp 99.3 °F (37.4 °C) (Temporal)   Resp 16   Ht 5' 7\" (1.702 m)   Wt 199 lb 12.8 oz (90.6 kg)   SpO2 97%   BMI 31.29 kg/m²     Physical Exam  Vitals and nursing note reviewed. Constitutional:       Appearance: Normal appearance. HENT:      Head: Normocephalic and atraumatic. Right Ear: Tympanic membrane, ear canal and external ear normal.      Left Ear: Tympanic membrane, ear canal and external ear normal.      Nose: Nose normal.      Mouth/Throat:      Mouth: Mucous membranes are moist.      Pharynx: Oropharynx is clear. Eyes:      Conjunctiva/sclera: Conjunctivae normal.   Cardiovascular:      Rate and Rhythm: Normal rate and regular rhythm. Pulses: Normal pulses. Heart sounds: Normal heart sounds. Pulmonary:      Effort: Pulmonary effort is normal. No respiratory distress. Breath sounds: Normal breath sounds. No stridor.  No wheezing, rhonchi or rales. Chest:      Chest wall: No tenderness. Abdominal:      General: There is no distension. Palpations: Abdomen is soft. Musculoskeletal:         General: No swelling. Skin:     General: Skin is warm and dry. Neurological:      General: No focal deficit present. Mental Status: He is alert and oriented to person, place, and time. Psychiatric:         Mood and Affect: Mood is anxious. Thought Content: Thought content normal. Thought content is not paranoid. Thought content does not include homicidal or suicidal ideation. Assessment/Plan:    ICD-10-CM ICD-9-CM    1. URI with cough and congestion  J06.9 465.9 azithromycin (ZITHROMAX) 250 mg tablet      predniSONE (DELTASONE) 20 mg tablet      2. Moderate persistent asthma with acute exacerbation  J45.41 493.92 fluticasone propionate (Flonase) 50 mcg/actuation nasal spray      montelukast (Singulair) 5 mg chewable tablet      albuterol-ipratropium (DUO-NEB) 2.5 mg-0.5 mg/3 ml nebu      azithromycin (ZITHROMAX) 250 mg tablet      predniSONE (DELTASONE) 20 mg tablet      3. Seasonal allergic rhinitis due to other allergic trigger  J30.89 477.8 fluticasone propionate (Flonase) 50 mcg/actuation nasal spray      montelukast (Singulair) 5 mg chewable tablet      4. Other chest pain  R07.89 786.59 AMB POC EKG ROUTINE W/ 12 LEADS, INTER & REP          EKG shows no acute findings. No previous available for comparison. Low suspicion for cardiac with no risk factors. Suspect symptoms pulmonary and seems pretty anxious. Will need to f/up with PCP est closer to home or therapy/psychiatrist soon. No safety concerns today. Recommend take medication/use neb/inhalers as directed. Pt says has been a while since needing steroids. Has done well with zpack. Counseled pt on potential medication AEs/interactions. D/c cyclobenzaprine. I rec pt Rtc or see local provider (drove over hour here today) in 1 week for recheck.   Otherwise UC or ER if no improvement/worsening in the next few days. Pt agrees to return to ER if \"Red flag\" sxs. Pt verbalizes understanding and agrees with the plan. Total time spent with the patient today was 45 minutes of which more than 50% was spent face to face with the patient.       Tyler Acosta, AGUILA